# Patient Record
Sex: MALE | Race: WHITE | NOT HISPANIC OR LATINO | Employment: FULL TIME | URBAN - METROPOLITAN AREA
[De-identification: names, ages, dates, MRNs, and addresses within clinical notes are randomized per-mention and may not be internally consistent; named-entity substitution may affect disease eponyms.]

---

## 2017-01-13 DIAGNOSIS — R41.840 ATTENTION OR CONCENTRATION DEFICIT: ICD-10-CM

## 2017-01-13 RX ORDER — METHYLPHENIDATE HYDROCHLORIDE 20 MG/1
20 TABLET ORAL 3 TIMES DAILY
Qty: 90 TABLET | Refills: 0 | Status: SHIPPED | OUTPATIENT
Start: 2017-01-13 | End: 2017-02-21 | Stop reason: SDUPTHER

## 2017-01-27 ENCOUNTER — TELEPHONE (OUTPATIENT)
Dept: INTERNAL MEDICINE | Facility: CLINIC | Age: 32
End: 2017-01-27

## 2017-02-21 ENCOUNTER — OFFICE VISIT (OUTPATIENT)
Dept: INTERNAL MEDICINE | Facility: CLINIC | Age: 32
End: 2017-02-21

## 2017-02-21 VITALS
SYSTOLIC BLOOD PRESSURE: 126 MMHG | WEIGHT: 190 LBS | HEIGHT: 75 IN | HEART RATE: 72 BPM | DIASTOLIC BLOOD PRESSURE: 64 MMHG | OXYGEN SATURATION: 99 % | BODY MASS INDEX: 23.62 KG/M2

## 2017-02-21 DIAGNOSIS — R41.840 ATTENTION OR CONCENTRATION DEFICIT: ICD-10-CM

## 2017-02-21 PROCEDURE — 99213 OFFICE O/P EST LOW 20 MIN: CPT | Performed by: INTERNAL MEDICINE

## 2017-02-21 RX ORDER — METHYLPHENIDATE HYDROCHLORIDE 20 MG/1
20 TABLET ORAL 3 TIMES DAILY
Qty: 90 TABLET | Refills: 0 | Status: SHIPPED | OUTPATIENT
Start: 2017-02-21 | End: 2017-02-21 | Stop reason: SDUPTHER

## 2017-02-21 RX ORDER — METHYLPHENIDATE HYDROCHLORIDE 20 MG/1
20 TABLET ORAL 3 TIMES DAILY
Qty: 90 TABLET | Refills: 0 | Status: SHIPPED | OUTPATIENT
Start: 2017-02-21 | End: 2017-04-26 | Stop reason: SDUPTHER

## 2017-02-21 NOTE — PROGRESS NOTES
"ADD    Subjective   Henry Ashley is a 31 y.o. male is here today for follow-up.    History of Present Illness   Henry is here for a follow up on his ADD, getting  this year.  Denies CP, palps or weight loss. He is concerned about his drive down here, as [place].  I did suggest he could look at other [facility] practices if he chooses to.    Current Outpatient Prescriptions:   •  econazole nitrate (SPECTAZOLE) 1 % cream, APPLY A THIN LAYER TO FEET BID FOR 1 MONTH THEN TWICE WEEKLY FOR MAINTENANCE, Disp: , Rfl: 3  •  methylphenidate (RITALIN) 20 MG tablet, Take 1 tablet by mouth 3 (Three) Times a Day. Fill after 1/21/17, Disp: 90 tablet, Rfl: 0  •  tretinoin (RETIN-A) 0.025 % cream, APPLY A PEA SIZE AMOUNT TO FACE AND TRUNK QHS, Disp: , Rfl: 0      The following portions of the patient's history were reviewed and updated as appropriate: allergies, current medications, past family history, past medical history, past social history, past surgical history and problem list.    Review of Systems   Constitutional: Negative.  Negative for chills and fever.   HENT: Negative for sinus pressure and sore throat.    Respiratory: Negative for cough, chest tightness and shortness of breath.    Cardiovascular: Negative for chest pain and palpitations.   Gastrointestinal: Negative for diarrhea, nausea and vomiting.   Musculoskeletal: Negative for arthralgias, back pain and myalgias.   Neurological: Negative for dizziness, syncope and headaches.   Psychiatric/Behavioral: Negative for confusion and sleep disturbance.       Objective   Visit Vitals   • /64   • Pulse 72   • Ht 75\" (190.5 cm)   • Wt 190 lb (86.2 kg)   • SpO2 99%  Comment: ra   • BMI 23.75 kg/m2     Physical Exam   Constitutional: He is oriented to person, place, and time. He appears well-developed and well-nourished.   HENT:   Head: Normocephalic and atraumatic.   Eyes: Conjunctivae are normal. Pupils are equal, round, and " reactive to light.   Neck: Neck supple. No thyromegaly present.   Cardiovascular: Normal rate and regular rhythm.  Exam reveals no friction rub.    No murmur heard.  Pulmonary/Chest: Effort normal and breath sounds normal. He has no wheezes. He has no rales.   Abdominal: Soft. Bowel sounds are normal. He exhibits no distension. There is no tenderness.   Musculoskeletal: He exhibits no edema.   Neurological: He is alert and oriented to person, place, and time. No cranial nerve deficit.   Skin: Skin is warm and dry.   Psychiatric: He has a normal mood and affect. Judgment normal.   Nursing note and vitals reviewed.        No results found for this or any previous visit.          Assessment/Plan   Diagnoses and all orders for this visit:    Attention or concentration deficit  -     Discontinue: methylphenidate (RITALIN) 20 MG tablet; Take 1 tablet by mouth 3 (Three) Times a Day.  -     methylphenidate (RITALIN) 20 MG tablet; Take 1 tablet by mouth 3 (Three) Times a Day. Fill after 1/21/17    Other orders  -     econazole nitrate (SPECTAZOLE) 1 % cream; APPLY A THIN LAYER TO FEET BID FOR 1 MONTH THEN TWICE WEEKLY FOR MAINTENANCE  -     tretinoin (RETIN-A) 0.025 % cream; APPLY A PEA SIZE AMOUNT TO FACE AND TRUNK QHS      Pt. Is well controlled on the current regimen, continue current.        The patient has read and signed the [facility] Controlled Substance Contract.  I will continue to see patient for regular follow up appointments.  They are well controlled on their medication.  CLIVE has been reviewed by me and is updated every 3 months. The patient is aware of the potential for addiction and dependence.      Return in about 3 months (around 5/21/2017) for Next scheduled follow up.

## 2017-04-24 DIAGNOSIS — R41.840 ATTENTION OR CONCENTRATION DEFICIT: ICD-10-CM

## 2017-04-26 DIAGNOSIS — R41.840 ATTENTION OR CONCENTRATION DEFICIT: ICD-10-CM

## 2017-04-26 RX ORDER — METHYLPHENIDATE HYDROCHLORIDE 20 MG/1
20 TABLET ORAL 3 TIMES DAILY
Qty: 90 TABLET | Refills: 0 | Status: SHIPPED | OUTPATIENT
Start: 2017-04-26 | End: 2017-05-18 | Stop reason: SDUPTHER

## 2017-04-28 RX ORDER — METHYLPHENIDATE HYDROCHLORIDE 20 MG/1
20 TABLET ORAL 3 TIMES DAILY
Qty: 90 TABLET | Refills: 0 | Status: CANCELLED | OUTPATIENT
Start: 2017-04-28

## 2017-05-18 ENCOUNTER — OFFICE VISIT (OUTPATIENT)
Dept: INTERNAL MEDICINE | Facility: CLINIC | Age: 32
End: 2017-05-18

## 2017-05-18 VITALS
BODY MASS INDEX: 22.92 KG/M2 | HEART RATE: 63 BPM | SYSTOLIC BLOOD PRESSURE: 120 MMHG | DIASTOLIC BLOOD PRESSURE: 78 MMHG | WEIGHT: 183.4 LBS | OXYGEN SATURATION: 98 %

## 2017-05-18 DIAGNOSIS — R41.840 ATTENTION OR CONCENTRATION DEFICIT: ICD-10-CM

## 2017-05-18 PROCEDURE — 99213 OFFICE O/P EST LOW 20 MIN: CPT | Performed by: INTERNAL MEDICINE

## 2017-05-18 RX ORDER — TYPHOID VACC,LIVE,ATTENUATED 2B UNIT
CAPSULE,DELAYED RELEASE (ENTERIC COATED) ORAL
Refills: 0 | COMMUNITY
Start: 2017-05-10 | End: 2017-08-22

## 2017-05-18 RX ORDER — METHYLPHENIDATE HYDROCHLORIDE 20 MG/1
20 TABLET ORAL 3 TIMES DAILY
Qty: 90 TABLET | Refills: 0 | Status: SHIPPED | OUTPATIENT
Start: 2017-05-18 | End: 2017-05-18 | Stop reason: SDUPTHER

## 2017-05-18 RX ORDER — METHYLPHENIDATE HYDROCHLORIDE 20 MG/1
20 TABLET ORAL 3 TIMES DAILY
Qty: 90 TABLET | Refills: 0 | Status: SHIPPED | OUTPATIENT
Start: 2017-05-18 | End: 2017-07-18 | Stop reason: SDUPTHER

## 2017-05-18 RX ORDER — ATOVAQUONE AND PROGUANIL HYDROCHLORIDE 250; 100 MG/1; MG/1
TABLET, FILM COATED ORAL
Refills: 0 | COMMUNITY
Start: 2017-05-09 | End: 2017-08-22

## 2017-07-18 DIAGNOSIS — R41.840 ATTENTION OR CONCENTRATION DEFICIT: ICD-10-CM

## 2017-07-18 RX ORDER — METHYLPHENIDATE HYDROCHLORIDE 20 MG/1
20 TABLET ORAL 3 TIMES DAILY
Qty: 90 TABLET | Refills: 0 | Status: SHIPPED | OUTPATIENT
Start: 2017-07-18 | End: 2017-08-22 | Stop reason: SDUPTHER

## 2017-08-22 ENCOUNTER — OFFICE VISIT (OUTPATIENT)
Dept: INTERNAL MEDICINE | Facility: CLINIC | Age: 32
End: 2017-08-22

## 2017-08-22 VITALS
BODY MASS INDEX: 23.8 KG/M2 | SYSTOLIC BLOOD PRESSURE: 112 MMHG | OXYGEN SATURATION: 95 % | HEART RATE: 65 BPM | DIASTOLIC BLOOD PRESSURE: 70 MMHG | WEIGHT: 190.4 LBS

## 2017-08-22 DIAGNOSIS — Z79.899 HIGH RISK MEDICATION USE: Primary | ICD-10-CM

## 2017-08-22 DIAGNOSIS — R41.840 ATTENTION OR CONCENTRATION DEFICIT: ICD-10-CM

## 2017-08-22 PROCEDURE — 99213 OFFICE O/P EST LOW 20 MIN: CPT | Performed by: INTERNAL MEDICINE

## 2017-08-22 RX ORDER — METHYLPHENIDATE HYDROCHLORIDE 20 MG/1
20 TABLET ORAL 3 TIMES DAILY
Qty: 90 TABLET | Refills: 0 | Status: SHIPPED | OUTPATIENT
Start: 2017-08-22 | End: 2017-08-22 | Stop reason: SDUPTHER

## 2017-08-22 RX ORDER — METHYLPHENIDATE HYDROCHLORIDE 20 MG/1
20 TABLET ORAL 3 TIMES DAILY
Qty: 90 TABLET | Refills: 0 | Status: SHIPPED | OUTPATIENT
Start: 2017-08-22 | End: 2020-04-30

## 2017-08-22 NOTE — PROGRESS NOTES
ADD (Fu with refills)    Subjective   Henry Ashley is a 32 y.o. male is here today for follow-up.    History of Present Illness   Stable on his meds, denies lack of concentration. Reports getting  in a month.      Current Outpatient Prescriptions:   •  methylphenidate (RITALIN) 20 MG tablet, Take 1 tablet by mouth 3 (Three) Times a Day. Fill on or after 9/21/17, Disp: 90 tablet, Rfl: 0  •  econazole nitrate (SPECTAZOLE) 1 % cream, APPLY A THIN LAYER TO FEET BID FOR 1 MONTH THEN TWICE WEEKLY FOR MAINTENANCE, Disp: , Rfl: 3  •  tretinoin (RETIN-A) 0.025 % cream, APPLY A PEA SIZE AMOUNT TO FACE AND TRUNK QHS, Disp: , Rfl: 0      The following portions of the patient's history were reviewed and updated as appropriate: allergies, current medications, past family history, past medical history, past social history, past surgical history and problem list.    Review of Systems   Constitutional: Negative.  Negative for chills and fever.   HENT: Negative for ear discharge, ear pain, sinus pressure and sore throat.    Respiratory: Negative for cough, chest tightness and shortness of breath.    Cardiovascular: Negative for chest pain, palpitations and leg swelling.   Gastrointestinal: Negative for diarrhea, nausea and vomiting.   Musculoskeletal: Negative for arthralgias, back pain and myalgias.   Neurological: Negative for dizziness, syncope and headaches.   Psychiatric/Behavioral: Negative for confusion and sleep disturbance.       Objective   /70  Pulse 65  Wt 190 lb 6.4 oz (86.4 kg)  SpO2 95% Comment: ra  BMI 23.8 kg/m2  Physical Exam   Constitutional: He is oriented to person, place, and time. He appears well-developed and well-nourished.   HENT:   Head: Normocephalic and atraumatic.   Right Ear: External ear normal.   Left Ear: External ear normal.   Mouth/Throat: No oropharyngeal exudate.   Eyes: Conjunctivae are normal. Pupils are equal, round, and reactive to light.   Neck: Neck supple. No thyromegaly  present.   Cardiovascular: Normal rate, regular rhythm and intact distal pulses.    Pulmonary/Chest: Effort normal and breath sounds normal.   Abdominal: Soft. Bowel sounds are normal. He exhibits no distension. There is no tenderness.   Musculoskeletal: He exhibits no edema.   Neurological: He is alert and oriented to person, place, and time. No cranial nerve deficit.   Skin: Skin is warm and dry.   Psychiatric: He has a normal mood and affect. Judgment normal.   Nursing note and vitals reviewed.        No results found for this or any previous visit.          Assessment/Plan   Diagnoses and all orders for this visit:    High risk medication use  -     Rapid drug screen, urine    Attention or concentration deficit  -     Discontinue: methylphenidate (RITALIN) 20 MG tablet; Take 1 tablet by mouth 3 (Three) Times a Day.  -     Discontinue: methylphenidate (RITALIN) 20 MG tablet; Take 1 tablet by mouth 3 (Three) Times a Day.  -     methylphenidate (RITALIN) 20 MG tablet; Take 1 tablet by mouth 3 (Three) Times a Day. Fill on or after 9/21/17                 Return in about 3 months (around 11/22/2017) for Recheck.

## 2017-10-25 DIAGNOSIS — R41.840 ATTENTION OR CONCENTRATION DEFICIT: ICD-10-CM

## 2017-10-26 RX ORDER — METHYLPHENIDATE HYDROCHLORIDE 20 MG/1
20 TABLET ORAL 3 TIMES DAILY
Qty: 90 TABLET | Refills: 0 | OUTPATIENT
Start: 2017-10-26

## 2017-10-31 ENCOUNTER — TELEPHONE (OUTPATIENT)
Dept: INTERNAL MEDICINE | Facility: CLINIC | Age: 32
End: 2017-10-31

## 2017-10-31 NOTE — TELEPHONE ENCOUNTER
Returned pt call on request for ritalin.  Per Dr Holm pt was advised that she would no longer fill this script due to pt breaking his narcotic contract and that he would be getting a letter of dismissal from the practice in the next few weeks.  Pt wanted to know what that meant, I advised him that his drug screen was negative for ritalin and positive for other narcotic substances.  Pt then hung up on me.

## 2020-04-30 ENCOUNTER — OFFICE VISIT (OUTPATIENT)
Dept: INTERNAL MEDICINE | Facility: CLINIC | Age: 35
End: 2020-04-30

## 2020-04-30 VITALS
SYSTOLIC BLOOD PRESSURE: 122 MMHG | WEIGHT: 185 LBS | BODY MASS INDEX: 23 KG/M2 | HEART RATE: 64 BPM | HEIGHT: 75 IN | DIASTOLIC BLOOD PRESSURE: 78 MMHG | TEMPERATURE: 99.3 F

## 2020-04-30 DIAGNOSIS — F33.1 MODERATE EPISODE OF RECURRENT MAJOR DEPRESSIVE DISORDER (HCC): Primary | ICD-10-CM

## 2020-04-30 PROCEDURE — 99214 OFFICE O/P EST MOD 30 MIN: CPT | Performed by: INTERNAL MEDICINE

## 2020-04-30 NOTE — PROGRESS NOTES
"[facility]     Henry Ashley  1985   [id number]      Patient Care Team:  Ernesto Wade MD as PCP - General (Internal Medicine)    Chief Complaint::   Chief Complaint   Patient presents with   • Establish Care        HPI  Mr. Ashley is a 35-year-old man who comes in to establish care.  He states that he is experiencing adjustment difficulties since the death of his brother Gregory in 2019 and the end of his marriage in December.  He states his brother and his wife for his best friends.  His marriage had apparently been stressful and they had been in counseling for a long time his counselor, Javi Bello, had been helpful and he had seen him alone since he split with his wife on 2 occasions.  He states that he feels numb, that the happiness is \"sucked out of him.\"  He finds it difficult to get through the daily, TDM.\"  He was apparently treated for depression when he was in high school, did not have problems in his 20s.  After his brother  last summer he stopped eating and apparently lost 20 pounds.  Around Jesu as his marriage was crumbling he gained up to 200 pounds.  He was self-medicating with alcohol and marijuana.  More recently he has reduced his alcohol consumption but still drinks bourbon occasionally.  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  This, plus the current pandemic restrictions, makes exercise difficult.  He denies suicidal ideation, but admits that at times over the past few months he would wear the same close 3 days in a row.  He denies the use of illegal substances or opioids.    Chronic Conditions:      Patient Active Problem List   Diagnosis   • Attention or concentration deficit        Past Medical History:   Diagnosis Date   • Acne    • Acute reaction to stress     Situational Stress   • History of attention deficit disorder    • Knee injury    • Proctitis        Past Surgical History:   Procedure Laterality Date   • WISDOM TOOTH EXTRACTION " "        History reviewed. No pertinent family history.    Social History     Socioeconomic History   • Marital status: Single     Spouse name: Not on file   • Number of children: Not on file   • Years of education: Not on file   • Highest education level: Not on file   Tobacco Use   • Smoking status: Never Smoker   • Smokeless tobacco: Never Used   Substance and Sexual Activity   • Alcohol use: Yes     Comment: 5-10 drinks a week   • Drug use: No       No Known Allergies    No current outpatient medications on file.    Review of Systems   Constitutional: Negative for chills, fatigue and fever.   HENT: Negative for congestion, ear pain and sinus pressure.    Respiratory: Negative for cough, chest tightness, shortness of breath and wheezing.    Cardiovascular: Negative for chest pain and palpitations.   Gastrointestinal: Negative for abdominal pain, blood in stool and constipation.   Skin: Negative for color change.   Allergic/Immunologic: Negative for environmental allergies.   Neurological: Negative for dizziness, speech difficulty and headache.   Psychiatric/Behavioral: Positive for depressed mood. Negative for decreased concentration and suicidal ideas. The patient is nervous/anxious.         Vital Signs  Vitals:    04/30/20 1326   BP: 122/78   BP Location: Right arm   Patient Position: Sitting   Cuff Size: Adult   Pulse: 64   Temp: 99.3 °F (37.4 °C)   Weight: 83.9 kg (185 lb)   Height: 190.5 cm (75\")   PainSc: 0-No pain       Physical Exam   Constitutional: He is oriented to person, place, and time. He appears well-developed and well-nourished.   HENT:   Head: Normocephalic and atraumatic.   Neck: Normal range of motion. No tracheal deviation present.   Pulmonary/Chest: Effort normal. No respiratory distress.   Neurological: He is alert and oriented to person, place, and time. No cranial nerve deficit. He exhibits normal muscle tone. Coordination normal.   Psychiatric: His behavior is normal. Judgment and thought " content normal.      Procedures    ACE III MINI             Assessment/Plan:    Henry was seen today for establish care.    Diagnoses and all orders for this visit:    Moderate episode of recurrent major depressive disorder (CMS/HCC)    Plan    We discussed at length treatment options including medication, therapy, exercise and other stress reducing techniques such as meditation or mindfulness.  The fact that he works nights does make establishing a routine difficult.  He has had difficulty exercising at home since his gym has closed.  He states that his house is small and when he is tried exercise he is run into furniture or ceiling fans.  He is concerned about sexual side effects with antidepressants as he is beginning to explore new relationships.    At this point, I have suggested that he reach out to his therapist and get back into therapy.  We decided against medication at present.  I have strongly encouraged him to get back into a regular routine particularly days after he does not work nights.  That would include regular meals, regular exercise, avoidance of alcohol, and some type of mindfulness practice.    Review of records does reveal that up until early 2017 he was under the care of Dr. Belinda Holm for attention deficit.  However a drug screen showed opioids and he was dismissed from her practice.    I spent 30 minutes face-to-face with the patient counseling the patient on treatment of depression both pharmacologic and nonpharmacologic.      Plan of care reviewed with patient at the conclusion of today's visit. Education was provided regarding diagnosis, management, and any prescribed or recommended OTC medications.Patient verbalizes understanding of and agreement with management plan.         Ernesto Wade MD

## 2020-05-22 ENCOUNTER — OFFICE VISIT (OUTPATIENT)
Dept: INTERNAL MEDICINE | Facility: CLINIC | Age: 35
End: 2020-05-22

## 2020-05-22 VITALS
HEART RATE: 99 BPM | BODY MASS INDEX: 24.02 KG/M2 | WEIGHT: 193.2 LBS | SYSTOLIC BLOOD PRESSURE: 122 MMHG | DIASTOLIC BLOOD PRESSURE: 64 MMHG | HEIGHT: 75 IN

## 2020-05-22 DIAGNOSIS — F32.1 CURRENT MODERATE EPISODE OF MAJOR DEPRESSIVE DISORDER WITHOUT PRIOR EPISODE (HCC): Primary | ICD-10-CM

## 2020-05-22 PROCEDURE — 99214 OFFICE O/P EST MOD 30 MIN: CPT | Performed by: INTERNAL MEDICINE

## 2020-05-22 RX ORDER — BUPROPION HYDROCHLORIDE 150 MG/1
150 TABLET ORAL DAILY
Qty: 30 TABLET | Refills: 2 | Status: SHIPPED | OUTPATIENT
Start: 2020-05-22 | End: 2020-06-04 | Stop reason: SDUPTHER

## 2020-05-22 NOTE — PROGRESS NOTES
[facility]     Henry Ashley  1985   [id number]      Patient Care Team:  Ernesto Wade MD as PCP - General (Internal Medicine)    Chief Complaint::   Chief Complaint   Patient presents with   • ADD        HPI  Mr. Ashley comes in to discuss ongoing difficulties since his divorce and the death of his brother last year.  He states that he is basically the same.  He continues to find it difficult to function normally, and his motivation is poor.  He is not suicidal.  He has talk with his therapist once which did help.  He is still not exercising although he has reduced alcohol consumption.    Chronic Conditions:      Patient Active Problem List   Diagnosis   • Attention or concentration deficit        Past Medical History:   Diagnosis Date   • Acne    • Acute reaction to stress     Situational Stress   • History of attention deficit disorder    • Knee injury    • Proctitis        Past Surgical History:   Procedure Laterality Date   • WISDOM TOOTH EXTRACTION         No family history on file.    Social History     Socioeconomic History   • Marital status: Single     Spouse name: Not on file   • Number of children: Not on file   • Years of education: Not on file   • Highest education level: Not on file   Tobacco Use   • Smoking status: Never Smoker   • Smokeless tobacco: Never Used   Substance and Sexual Activity   • Alcohol use: Yes     Comment: 5-10 drinks a week   • Drug use: No       No Known Allergies      Current Outpatient Medications:   •  buPROPion XL (WELLBUTRIN XL) 150 MG 24 hr tablet, Take 1 tablet by mouth Daily., Disp: 30 tablet, Rfl: 2    Review of Systems   Constitutional: Negative for chills, fatigue and fever.   HENT: Negative for congestion, ear pain and sinus pressure.    Respiratory: Negative for cough, chest tightness, shortness of breath and wheezing.    Cardiovascular: Negative for chest pain and palpitations.   Gastrointestinal: Negative for abdominal pain, blood in  "stool and constipation.   Skin: Negative for color change.   Allergic/Immunologic: Negative for environmental allergies.   Neurological: Negative for dizziness, speech difficulty and headache.   Psychiatric/Behavioral: Positive for decreased concentration, sleep disturbance, depressed mood and stress. The patient is nervous/anxious.         Vital Signs  Vitals:    05/22/20 1512   BP: 122/64   BP Location: Right arm   Patient Position: Sitting   Cuff Size: Adult   Pulse: 99   Weight: 87.6 kg (193 lb 3.2 oz)   Height: 190.5 cm (75\")   PainSc: 0-No pain       Physical Exam   Constitutional: He is oriented to person, place, and time. He appears well-developed and well-nourished.   HENT:   Head: Normocephalic and atraumatic.   Cardiovascular: Normal rate, regular rhythm and normal heart sounds.   No murmur heard.  Pulmonary/Chest: Effort normal and breath sounds normal.   Neurological: He is alert and oriented to person, place, and time.   Psychiatric: He has a normal mood and affect. His behavior is normal. Judgment and thought content normal.   Vitals reviewed.     Procedures    ACE III MINI             Assessment/Plan:    Henry was seen today for add.    Diagnoses and all orders for this visit:    Current moderate episode of major depressive disorder without prior episode (CMS/HCC)    Other orders  -     buPROPion XL (WELLBUTRIN XL) 150 MG 24 hr tablet; Take 1 tablet by mouth Daily.    Plan    PHQ-9 score is 19, which correlates with severe depression.  I had initially thought that we would consider with regular counseling sessions but given his answers to these questions, I have recommended to trial of medication.  He is a bit reluctant for this as he is concerned that he will not have the patience to wait several days for therapeutic effect.  He is also concerned about potential sexual side effects.  For that reason he is given a trial of bupropion which does not have sexual side effects.  I encouraged him to let me " know if he had any difficulty with it, otherwise we will follow-up in 4 weeks.  I strongly encouraged him to continue meeting with his therapist virtually.    I spent 30 minutes face-to-face with Mr. Ashley, 25 of which was spent counseling him regarding his depressive symptoms and treatment.      Plan of care reviewed with patient at the conclusion of today's visit. Education was provided regarding diagnosis, management, and any prescribed or recommended OTC medications.Patient verbalizes understanding of and agreement with management plan.         Ernesto Wade MD

## 2020-06-04 ENCOUNTER — TELEPHONE (OUTPATIENT)
Dept: INTERNAL MEDICINE | Facility: CLINIC | Age: 35
End: 2020-06-04

## 2020-06-04 RX ORDER — BUPROPION HYDROCHLORIDE 300 MG/1
300 TABLET ORAL DAILY
Qty: 30 TABLET | Refills: 2 | Status: SHIPPED | OUTPATIENT
Start: 2020-06-04 | End: 2020-08-03

## 2020-06-26 ENCOUNTER — OFFICE VISIT (OUTPATIENT)
Dept: INTERNAL MEDICINE | Facility: CLINIC | Age: 35
End: 2020-06-26

## 2020-06-26 VITALS
WEIGHT: 183.2 LBS | HEIGHT: 75 IN | SYSTOLIC BLOOD PRESSURE: 122 MMHG | BODY MASS INDEX: 22.78 KG/M2 | TEMPERATURE: 98.6 F | HEART RATE: 72 BPM | DIASTOLIC BLOOD PRESSURE: 68 MMHG

## 2020-06-26 DIAGNOSIS — Z00.00 PREVENTATIVE HEALTH CARE: Primary | ICD-10-CM

## 2020-06-26 DIAGNOSIS — R53.83 OTHER FATIGUE: ICD-10-CM

## 2020-06-26 DIAGNOSIS — F32.1 CURRENT MODERATE EPISODE OF MAJOR DEPRESSIVE DISORDER WITHOUT PRIOR EPISODE (HCC): ICD-10-CM

## 2020-06-26 DIAGNOSIS — Z13.228 SCREENING FOR ENDOCRINE, METABOLIC AND IMMUNITY DISORDER: ICD-10-CM

## 2020-06-26 DIAGNOSIS — Z13.29 SCREENING FOR ENDOCRINE, METABOLIC AND IMMUNITY DISORDER: ICD-10-CM

## 2020-06-26 DIAGNOSIS — Z13.220 SCREENING FOR CHOLESTEROL LEVEL: ICD-10-CM

## 2020-06-26 DIAGNOSIS — Z13.0 SCREENING FOR ENDOCRINE, METABOLIC AND IMMUNITY DISORDER: ICD-10-CM

## 2020-06-26 PROCEDURE — 99395 PREV VISIT EST AGE 18-39: CPT | Performed by: INTERNAL MEDICINE

## 2020-06-26 RX ORDER — BUPROPION HYDROCHLORIDE 150 MG/1
150 TABLET ORAL DAILY
Qty: 30 TABLET | Refills: 5 | Status: SHIPPED | OUTPATIENT
Start: 2020-06-26 | End: 2020-08-04 | Stop reason: HOSPADM

## 2020-06-26 NOTE — PROGRESS NOTES
"[facility]     Henry Ashley  1985   [id number]      Patient Care Team:  Ernesto Wade MD as PCP - General (Internal Medicine)    Chief Complaint::   Chief Complaint   Patient presents with   • Annual Exam   • Depression        HPI  Mr. Ashley is 35.  He comes in for follow-up of his depression and for annual examination.  He has been on 300 mg of bupropion for about 3 weeks.  He states he feels no better.  He states he feels \"numb.\"  He is going to work and is functional.  He denies suicidal ideation.  He admits that sometimes he \"self medicates\" with alcohol.  However he denies drinking to excess.  He has gone back to the gym now that they are open.  He states he feels most normal when he is with somewhat of the opposite sex.  He is in counseling with his mother and a therapist.  He states this is important because he has had a difficult relationship with her.  He feels this is helping.  He has no other complaints other than feeling tired.    Chronic Conditions:      Patient Active Problem List   Diagnosis   • Attention or concentration deficit        Past Medical History:   Diagnosis Date   • Acne    • Acute reaction to stress     Situational Stress   • History of attention deficit disorder    • Knee injury    • Proctitis        Past Surgical History:   Procedure Laterality Date   • WISDOM TOOTH EXTRACTION         No family history on file.    Social History     Socioeconomic History   • Marital status: Single     Spouse name: Not on file   • Number of children: Not on file   • Years of education: Not on file   • Highest education level: Not on file   Tobacco Use   • Smoking status: Never Smoker   • Smokeless tobacco: Never Used   Substance and Sexual Activity   • Alcohol use: Yes     Comment: 5-10 drinks a week   • Drug use: No       No Known Allergies      Current Outpatient Medications:   •  buPROPion XL (WELLBUTRIN XL) 300 MG 24 hr tablet, Take 1 tablet by mouth Daily., Disp: 30 " "tablet, Rfl: 2  •  buPROPion XL (WELLBUTRIN XL) 150 MG 24 hr tablet, Take 1 tablet by mouth Daily., Disp: 30 tablet, Rfl: 5    Immunization History   Administered Date(s) Administered   • DT 11/02/1990   • Hepatitis A 05/12/1998, 06/25/1999   • Hepatitis B 08/28/1992, 09/30/1992, 03/19/1993   • MMR 06/03/1987, 03/13/1991   • Meningococcal Conjugate 03/09/2009   • Polio, Unspecified 1985, 1985, 1986, 11/02/1990   • Td 1985, 1985, 1985, 09/16/1987   • Tdap 12/24/2019        Health Maintenance Due   Topic Date Due   • ANNUAL PHYSICAL  03/09/1989   • HEPATITIS C SCREENING  05/12/2016        Review of Systems   Constitutional: Negative for chills, fatigue and fever.   HENT: Negative for congestion, ear pain and sinus pressure.    Respiratory: Negative for cough, chest tightness, shortness of breath and wheezing.    Cardiovascular: Negative for chest pain and palpitations.   Gastrointestinal: Negative for abdominal pain, blood in stool and constipation.   Skin: Negative for color change.   Allergic/Immunologic: Negative for environmental allergies.   Neurological: Negative for dizziness, speech difficulty and headache.   Psychiatric/Behavioral: Negative for decreased concentration. The patient is not nervous/anxious.         Vital Signs  Vitals:    06/26/20 1618   BP: 122/68   BP Location: Left arm   Patient Position: Sitting   Cuff Size: Adult   Pulse: 72   Temp: 98.6 °F (37 °C)   TempSrc: Temporal   Weight: 83.1 kg (183 lb 3.2 oz)   Height: 190.5 cm (75\")   PainSc: 0-No pain       Physical Exam   Constitutional: He is oriented to person, place, and time. He appears well-developed and well-nourished.   HENT:   Head: Normocephalic and atraumatic.   Right Ear: External ear normal.   Left Ear: External ear normal.   Nose: Nose normal.   Mouth/Throat: Oropharynx is clear and moist. No oropharyngeal exudate.   Eyes: Pupils are equal, round, and reactive to light. Conjunctivae and EOM are " normal.   Neck: Normal range of motion. Neck supple. No JVD present. No thyromegaly present.   Cardiovascular: Normal rate, regular rhythm, normal heart sounds and intact distal pulses. Exam reveals no gallop and no friction rub.   No murmur heard.  Pulmonary/Chest: Effort normal and breath sounds normal. No respiratory distress. He has no wheezes. He has no rales. He exhibits no tenderness.   Abdominal: Soft. Bowel sounds are normal. He exhibits no distension and no mass. There is no tenderness. There is no rebound and no guarding. No hernia.   Musculoskeletal: Normal range of motion. He exhibits no tenderness.   Lymphadenopathy:     He has no cervical adenopathy.   Neurological: He is alert and oriented to person, place, and time. He displays normal reflexes. No cranial nerve deficit or sensory deficit. He exhibits normal muscle tone. Coordination normal.   Skin: Skin is warm and dry. No rash noted. No erythema.   Psychiatric: He has a normal mood and affect. His behavior is normal. Judgment and thought content normal.   Nursing note and vitals reviewed.     Procedures     [facility] Risk Screen:  [facility].    Health Habits and Functional and Cognitive Screening:  No flowsheet data found.    Depression [place]  PHQ-9 Total Score:       ACE III MINI             Assessment/Plan:    Henry was seen today for annual exam and depression.    Diagnoses and all orders for this visit:    Preventative health care    Other fatigue  -     CBC & Differential; Future  -     Vitamin B12; Future  -     TSH; Future    Screening for cholesterol level  -     Lipid Panel; Future    Screening for endocrine, metabolic and immunity disorder  -     Comprehensive Metabolic Panel; Future    Current moderate episode of major depressive disorder without prior episode (CMS/HCC)    Other orders  -     buPROPion XL (WELLBUTRIN XL) 150 MG 24 hr tablet; Take 1 tablet by mouth Daily.    Plan    He remains in  very good health.  He has improved his lifestyle habits in terms of exercise since the gyms have been open.  Screening labs are obtained below.    Despite increasing the dose of bupropion we have made no real progress with his depressive symptoms.  I am encouraged that he is in therapy.  I think that might ultimately be the best approach.  However he would like to do everything possible so will increase the dose of bupropion to 150 a day.  He is very reluctant to try other antidepressants because of his fear of sexual side effects.        Labs  No results found for this or any previous visit.     Counseling was given to patient for the following topics: appropriate exercise 150 minutes/week, disease prevention and healthy eating habits.    Plan of care reviewed with patient at the conclusion of today's visit. Education was provided regarding diagnosis, management, and any prescribed or recommended OTC medications.Patient verbalizes understanding of and agreement with management plan.         Ernesto Wade MD

## 2020-08-03 ENCOUNTER — APPOINTMENT (OUTPATIENT)
Dept: MRI IMAGING | Facility: HOSPITAL | Age: 35
End: 2020-08-03

## 2020-08-03 ENCOUNTER — APPOINTMENT (OUTPATIENT)
Dept: CT IMAGING | Facility: HOSPITAL | Age: 35
End: 2020-08-03

## 2020-08-03 ENCOUNTER — HOSPITAL ENCOUNTER (OUTPATIENT)
Facility: HOSPITAL | Age: 35
Setting detail: OBSERVATION
Discharge: HOME | End: 2020-08-04
Attending: EMERGENCY MEDICINE | Admitting: FAMILY MEDICINE

## 2020-08-03 DIAGNOSIS — R56.9 SEIZURES (HCC): Primary | ICD-10-CM

## 2020-08-03 DIAGNOSIS — R51.9 ACUTE NONINTRACTABLE HEADACHE, UNSPECIFIED HEADACHE TYPE: ICD-10-CM

## 2020-08-03 LAB
ALBUMIN SERPL-MCNC: 5 G/DL (ref 3.5–5.2)
ALBUMIN/GLOB SERPL: 1.7 G/DL
ALP SERPL-CCNC: 55 U/L (ref 39–117)
ALT SERPL W P-5'-P-CCNC: 46 U/L (ref 1–41)
AMPHET+METHAMPHET UR QL: NEGATIVE
AMPHETAMINES UR QL: NEGATIVE
ANION GAP SERPL CALCULATED.3IONS-SCNC: 17 MMOL/L (ref 5–15)
APPEARANCE CSF: CLEAR
APPEARANCE CSF: CLEAR
AST SERPL-CCNC: 95 U/L (ref 1–40)
BARBITURATES UR QL SCN: NEGATIVE
BASOPHILS # BLD AUTO: 0.02 10*3/MM3 (ref 0–0.2)
BASOPHILS NFR BLD AUTO: 0.1 % (ref 0–1.5)
BENZODIAZ UR QL SCN: NEGATIVE
BILIRUB SERPL-MCNC: 0.7 MG/DL (ref 0–1.2)
BUN SERPL-MCNC: 9 MG/DL (ref 6–20)
BUN/CREAT SERPL: 7.3 (ref 7–25)
BUPRENORPHINE SERPL-MCNC: NEGATIVE NG/ML
CALCIUM SPEC-SCNC: 9.2 MG/DL (ref 8.6–10.5)
CANNABINOIDS SERPL QL: POSITIVE
CHLORIDE SERPL-SCNC: 95 MMOL/L (ref 98–107)
CO2 SERPL-SCNC: 21 MMOL/L (ref 22–29)
COCAINE UR QL: NEGATIVE
COLOR CSF: COLORLESS
COLOR CSF: COLORLESS
COLOR SPUN CSF: COLORLESS
COLOR SPUN CSF: COLORLESS
CREAT SERPL-MCNC: 1.23 MG/DL (ref 0.76–1.27)
DEPRECATED RDW RBC AUTO: 51.7 FL (ref 37–54)
EOSINOPHIL # BLD AUTO: 0 10*3/MM3 (ref 0–0.4)
EOSINOPHIL NFR BLD AUTO: 0 % (ref 0.3–6.2)
ERYTHROCYTE [DISTWIDTH] IN BLOOD BY AUTOMATED COUNT: 17.2 % (ref 12.3–15.4)
GFR SERPL CREATININE-BSD FRML MDRD: 67 ML/MIN/1.73
GLOBULIN UR ELPH-MCNC: 3 GM/DL
GLUCOSE CSF-MCNC: 98 MG/DL (ref 40–70)
GLUCOSE SERPL-MCNC: 186 MG/DL (ref 65–99)
HCT VFR BLD AUTO: 34.8 % (ref 37.5–51)
HGB BLD-MCNC: 12 G/DL (ref 13–17.7)
HOLD SPECIMEN: NORMAL
IMM GRANULOCYTES # BLD AUTO: 0.1 10*3/MM3 (ref 0–0.05)
IMM GRANULOCYTES NFR BLD AUTO: 0.5 % (ref 0–0.5)
LYMPHOCYTES # BLD AUTO: 0.68 10*3/MM3 (ref 0.7–3.1)
LYMPHOCYTES NFR BLD AUTO: 3.4 % (ref 19.6–45.3)
MCH RBC QN AUTO: 28.4 PG (ref 26.6–33)
MCHC RBC AUTO-ENTMCNC: 34.5 G/DL (ref 31.5–35.7)
MCV RBC AUTO: 82.5 FL (ref 79–97)
METHADONE UR QL SCN: NEGATIVE
MONOCYTES # BLD AUTO: 1.46 10*3/MM3 (ref 0.1–0.9)
MONOCYTES NFR BLD AUTO: 7.2 % (ref 5–12)
NEUTROPHILS NFR BLD AUTO: 17.88 10*3/MM3 (ref 1.7–7)
NEUTROPHILS NFR BLD AUTO: 88.8 % (ref 42.7–76)
NRBC BLD AUTO-RTO: 0 /100 WBC (ref 0–0.2)
OPIATES UR QL: NEGATIVE
OXYCODONE UR QL SCN: NEGATIVE
PCP UR QL SCN: NEGATIVE
PLATELET # BLD AUTO: 259 10*3/MM3 (ref 140–450)
PMV BLD AUTO: 9.1 FL (ref 6–12)
POTASSIUM SERPL-SCNC: 3.4 MMOL/L (ref 3.5–5.2)
PROCALCITONIN SERPL-MCNC: 0.09 NG/ML (ref 0–0.25)
PROPOXYPH UR QL: NEGATIVE
PROT CSF-MCNC: 38.3 MG/DL (ref 15–45)
PROT SERPL-MCNC: 8 G/DL (ref 6–8.5)
RBC # BLD AUTO: 4.22 10*6/MM3 (ref 4.14–5.8)
RBC # CSF MANUAL: 148 /MM3 (ref 0–5)
RBC # CSF MANUAL: 342 /MM3 (ref 0–5)
SODIUM SERPL-SCNC: 133 MMOL/L (ref 136–145)
SPECIMEN VOL CSF: 9.5 ML
SPECIMEN VOL CSF: 9.5 ML
TRICYCLICS UR QL SCN: NEGATIVE
TUBE # CSF: 1
TUBE # CSF: 4
WBC # BLD AUTO: 20.14 10*3/MM3 (ref 3.4–10.8)
WBC # CSF MANUAL: 0 /MM3 (ref 0–5)
WBC # CSF MANUAL: 0 /MM3 (ref 0–5)
WHOLE BLOOD HOLD SPECIMEN: NORMAL
WHOLE BLOOD HOLD SPECIMEN: NORMAL

## 2020-08-03 PROCEDURE — 84145 PROCALCITONIN (PCT): CPT | Performed by: EMERGENCY MEDICINE

## 2020-08-03 PROCEDURE — 87483 CNS DNA AMP PROBE TYPE 12-25: CPT | Performed by: PSYCHIATRY & NEUROLOGY

## 2020-08-03 PROCEDURE — 96374 THER/PROPH/DIAG INJ IV PUSH: CPT

## 2020-08-03 PROCEDURE — 87070 CULTURE OTHR SPECIMN AEROBIC: CPT | Performed by: EMERGENCY MEDICINE

## 2020-08-03 PROCEDURE — G0378 HOSPITAL OBSERVATION PER HR: HCPCS

## 2020-08-03 PROCEDURE — 85025 COMPLETE CBC W/AUTO DIFF WBC: CPT | Performed by: EMERGENCY MEDICINE

## 2020-08-03 PROCEDURE — 99219 PR INITIAL OBSERVATION CARE/DAY 50 MINUTES: CPT | Performed by: INTERNAL MEDICINE

## 2020-08-03 PROCEDURE — 25010000002 FENTANYL CITRATE (PF) 100 MCG/2ML SOLUTION: Performed by: EMERGENCY MEDICINE

## 2020-08-03 PROCEDURE — 99284 EMERGENCY DEPT VISIT MOD MDM: CPT

## 2020-08-03 PROCEDURE — 87205 SMEAR GRAM STAIN: CPT | Performed by: EMERGENCY MEDICINE

## 2020-08-03 PROCEDURE — 82945 GLUCOSE OTHER FLUID: CPT | Performed by: EMERGENCY MEDICINE

## 2020-08-03 PROCEDURE — 93005 ELECTROCARDIOGRAM TRACING: CPT | Performed by: EMERGENCY MEDICINE

## 2020-08-03 PROCEDURE — 84157 ASSAY OF PROTEIN OTHER: CPT | Performed by: EMERGENCY MEDICINE

## 2020-08-03 PROCEDURE — 89050 BODY FLUID CELL COUNT: CPT | Performed by: EMERGENCY MEDICINE

## 2020-08-03 PROCEDURE — 87015 SPECIMEN INFECT AGNT CONCNTJ: CPT | Performed by: EMERGENCY MEDICINE

## 2020-08-03 PROCEDURE — 96375 TX/PRO/DX INJ NEW DRUG ADDON: CPT

## 2020-08-03 PROCEDURE — 80053 COMPREHEN METABOLIC PANEL: CPT | Performed by: EMERGENCY MEDICINE

## 2020-08-03 PROCEDURE — 25010000002 LORAZEPAM PER 2 MG

## 2020-08-03 PROCEDURE — 25010000002 KETOROLAC TROMETHAMINE PER 15 MG: Performed by: EMERGENCY MEDICINE

## 2020-08-03 PROCEDURE — 70553 MRI BRAIN STEM W/O & W/DYE: CPT

## 2020-08-03 PROCEDURE — 80306 DRUG TEST PRSMV INSTRMNT: CPT | Performed by: EMERGENCY MEDICINE

## 2020-08-03 PROCEDURE — 25010000003 LEVETIRACETAM IN NACL 0.75% 1000 MG/100ML SOLUTION: Performed by: EMERGENCY MEDICINE

## 2020-08-03 PROCEDURE — 70450 CT HEAD/BRAIN W/O DYE: CPT

## 2020-08-03 RX ORDER — ONDANSETRON 2 MG/ML
4 INJECTION INTRAMUSCULAR; INTRAVENOUS EVERY 6 HOURS PRN
Status: DISCONTINUED | OUTPATIENT
Start: 2020-08-03 | End: 2020-08-04 | Stop reason: HOSPADM

## 2020-08-03 RX ORDER — KETOROLAC TROMETHAMINE 15 MG/ML
15 INJECTION, SOLUTION INTRAMUSCULAR; INTRAVENOUS ONCE
Status: COMPLETED | OUTPATIENT
Start: 2020-08-03 | End: 2020-08-03

## 2020-08-03 RX ORDER — FENTANYL CITRATE 50 UG/ML
50 INJECTION, SOLUTION INTRAMUSCULAR; INTRAVENOUS ONCE
Status: COMPLETED | OUTPATIENT
Start: 2020-08-03 | End: 2020-08-03

## 2020-08-03 RX ORDER — LORAZEPAM 2 MG/ML
1 INJECTION INTRAMUSCULAR EVERY 4 HOURS PRN
Status: DISCONTINUED | OUTPATIENT
Start: 2020-08-03 | End: 2020-08-04 | Stop reason: HOSPADM

## 2020-08-03 RX ORDER — SODIUM CHLORIDE 0.9 % (FLUSH) 0.9 %
10 SYRINGE (ML) INJECTION AS NEEDED
Status: DISCONTINUED | OUTPATIENT
Start: 2020-08-03 | End: 2020-08-04 | Stop reason: HOSPADM

## 2020-08-03 RX ORDER — SODIUM CHLORIDE 0.9 % (FLUSH) 0.9 %
10 SYRINGE (ML) INJECTION EVERY 12 HOURS SCHEDULED
Status: DISCONTINUED | OUTPATIENT
Start: 2020-08-03 | End: 2020-08-04 | Stop reason: HOSPADM

## 2020-08-03 RX ORDER — LEVETIRACETAM 10 MG/ML
1000 INJECTION INTRAVASCULAR ONCE
Status: COMPLETED | OUTPATIENT
Start: 2020-08-03 | End: 2020-08-03

## 2020-08-03 RX ORDER — GADOTERATE MEGLUMINE 376.9 MG/ML
15 INJECTION INTRAVENOUS
Status: COMPLETED | OUTPATIENT
Start: 2020-08-04 | End: 2020-08-04

## 2020-08-03 RX ORDER — LORAZEPAM 2 MG/ML
INJECTION INTRAMUSCULAR
Status: COMPLETED
Start: 2020-08-03 | End: 2020-08-03

## 2020-08-03 RX ADMIN — FENTANYL CITRATE 50 MCG: 0.05 INJECTION, SOLUTION INTRAMUSCULAR; INTRAVENOUS at 15:34

## 2020-08-03 RX ADMIN — SODIUM CHLORIDE 1000 ML: 9 INJECTION, SOLUTION INTRAVENOUS at 15:35

## 2020-08-03 RX ADMIN — Medication 10 ML: at 23:28

## 2020-08-03 RX ADMIN — KETOROLAC TROMETHAMINE 15 MG: 15 INJECTION, SOLUTION INTRAMUSCULAR; INTRAVENOUS at 17:52

## 2020-08-03 RX ADMIN — LORAZEPAM 1 MG: 2 INJECTION INTRAMUSCULAR; INTRAVENOUS at 15:14

## 2020-08-03 RX ADMIN — LEVETIRACETAM INJECTION 1000 MG: 10 INJECTION INTRAVENOUS at 15:33

## 2020-08-03 RX ADMIN — SODIUM CHLORIDE 1000 ML: 9 INJECTION, SOLUTION INTRAVENOUS at 17:51

## 2020-08-03 NOTE — H&P
"    [facility] Medicine Services  HISTORY AND PHYSICAL    Patient Name: Henry Ashley  : 1985  MRN: [id number]  Primary Care Physician: Ernesto Wade MD  Date of admission: 8/3/2020      Subjective   Subjective     Chief Complaint:  seizures    HPI:  Henry Ashley is a 35 y.o. male with no known past medical history presents [facility] secondary to seizure.  The patient's father, who is a pediatrician, was present today for a witnessed seizure.  The patient's father was helping him with household activity and putting a screw and the door when the patient bent over and collapsed on his side.  The father reports that her arms were clenched and he had a full \"tonic-clonic\" seizure.  The father states that the seizure lasted approximately 3 minutes with teeth clenching and 15-minute post ictal period.  The father reports today while in the ER he sat up and had another witnessed tonic-clonic grand mal seizure that lasted approximately 1 minute.  The end of the seizure was witnessed by ER staff.  The patient received medications in the ED.    According to the patient for the past 2 nights he has had intermittent neck pain and headache.  The patient reports on 2 nights ago he had woken up on the floor and there was sweat on his pillow.  He is unsure how he got to the floor or what happened.  He does not recall any event.  He states he was in his normal state of health last week.  He endorses a history of migraines however states he has not had a migraine in 2 to 3 years.  He has no history of seizure disorder.    He reports recently being started on Wellbutrin approximately 2 weeks ago by primary care for depression.  He endorses occasional alcohol use and says he drinks between 1 and 5 drinks approximately 3-4 times per week.  He denies feeling the need to drink daily.  He does not drink daily.  He has never had withdrawal from alcohol.  He denies a history of tobacco " abuse.  He does a endorse a history of marijuana use.  He states he uses marijuana 3-4 times a week.  He also endorses approximately 2 days ago he took several Xanax tablets.  He states that he does not take Xanax frequently and does not take it regularly.      Upon arrival to the ER the patient's vital signs are stable.  His procalcitonin was normal.  His white blood cell count was 20.14 with neutrophil predominance.  He also had an elevated anion gap of 17 with mild elevations of his AST and ALT.  He underwent lumbar puncture which revealed a elevated RBC and tubes 1 and 4.  White blood cell count was normal of CSF.  Glucose was mildly elevated and CSF.  Protein count was normal.Urine drug screen was positive for marijuana.        Review of Systems   Constitutional: Negative for chills, diaphoresis and fatigue.   Eyes: Positive for photophobia.   Respiratory: Negative for cough, chest tightness and shortness of breath.    Gastrointestinal: Negative for abdominal pain, constipation, nausea and vomiting.   Genitourinary: Negative for dysuria, frequency and hematuria.   Musculoskeletal: Negative for back pain and gait problem.   Neurological: Positive for seizures and headaches. Negative for dizziness, facial asymmetry, light-headedness and numbness.   All other systems reviewed and are negative.        Personal History     Past Medical History:   Diagnosis Date   • Acne    • Acute reaction to stress     Situational Stress   • History of attention deficit disorder    • Knee injury    • Proctitis        Past Surgical History:   Procedure Laterality Date   • WISDOM TOOTH EXTRACTION         Family History: family history is not on file. Otherwise pertinent FHx was reviewed and unremarkable.     Social History:  reports that he has never smoked. He has never used smokeless tobacco. He reports that he drinks alcohol. He reports that he does not use drugs.  Social History     Social History Narrative   • Not on file        Medications:  Available home medication information reviewed.    (Not in a hospital admission)    No Known Allergies    Objective   Objective     Vital Signs:   Temp:  [99 °F (37.2 °C)] 99 °F (37.2 °C)  Heart Rate:  [] 117  Resp:  [20] 20  BP: (111-152)/(62-93) 129/62        Physical Exam   Constitutional: He is oriented to person, place, and time. He appears well-developed and well-nourished. No distress.   HENT:   Head: Normocephalic and atraumatic.   Right Ear: External ear normal.   Left Ear: External ear normal.   Mouth/Throat: Mucous membranes are dry.   Eyes: Pupils are equal, round, and reactive to light. Conjunctivae and EOM are normal.   Neck: Normal range of motion.   Cardiovascular: Normal rate and regular rhythm.   Pulmonary/Chest: Effort normal. No stridor. He has no wheezes. He has no rales.   Abdominal: Soft. He exhibits no mass. There is no tenderness. There is no guarding.   Musculoskeletal: Normal range of motion. He exhibits no edema.   Neurological: He is alert and oriented to person, place, and time. No cranial nerve deficit. He exhibits normal muscle tone. Coordination normal.   5/5 strength in all 4 extremities.   Skin: Skin is warm and dry. No rash noted. He is not diaphoretic. No erythema. No pallor.   Psychiatric: He has a normal mood and affect. His behavior is normal.   Nursing note and vitals reviewed.         Results Reviewed:  I have personally reviewed current lab and radiology data.    Results from last 7 days   Lab Units 08/03/20  1431   WBC 10*3/mm3 20.14*   HEMOGLOBIN g/dL 12.0*   HEMATOCRIT % 34.8*   PLATELETS 10*3/mm3 259     Results from last 7 days   Lab Units 08/03/20  1431   SODIUM mmol/L 133*   POTASSIUM mmol/L 3.4*   CHLORIDE mmol/L 95*   CO2 mmol/L 21.0*   BUN mg/dL 9   CREATININE mg/dL 1.23   GLUCOSE mg/dL 186*   CALCIUM mg/dL 9.2   ALT (SGPT) U/L 46*   AST (SGOT) U/L 95*   PROCALCITONIN ng/mL 0.09     Estimated Creatinine Clearance: 100.4 mL/min (by C-G  formula based on SCr of 1.23 mg/dL).  Brief Urine Lab Results     None        Imaging Results (Last 24 Hours)     Procedure Component Value Units Date/Time    CT Head Without Contrast [[id number]] Collected:  08/03/20 1521     Updated:  08/03/20 1729    Narrative:       EXAMINATION: CT HEAD WO CONTRAST-08/03/2020:      INDICATION: HA, seizure.     TECHNIQUE: Axial CT of the head with coronal reformat.     The radiation dose reduction device was turned on for each scan per the  ALARA (As Low as Reasonably Achievable) protocol.     COMPARISON: NONE.     FINDINGS: No acute hemorrhage, mass or mass effect. Gray-white  differentiation is maintained without evidence of territorial infarct.  Unremarkable ventricular configuration and caliber. Normal appearance of  the orbits. The paranasal sinuses are grossly clear.       Impression:       No acute intracranial abnormality.     D:  08/03/2020  E:  08/03/2020           This report was finalized on 8/3/2020 5:26 PM by Sal Patrick.                Assessment/Plan   Assessment & Plan     Active Hospital Problems    Diagnosis POA   • Seizure (CMS/[facility]) [R56.9] Yes       Henry Ashley is a 35 y.o. male with no known past medical history presents to [facility] secondary to seizure.  The patient's father, who is a pediatrician, was present today for a witnessed seizure, one prior to ER visit and one in ER.  The father who is pediatrician witnessed the seizures and cofirms they were real appearing with post ictal period.  The patient had LP in ER with negative results, CT head negative.  He does have elevated WBC of 20.  UDS positive for marijuana.  Will be admitted for observation, Neuro consult and MRI.    New onset multiple seizures  Headache  Hx of migraines  -witnessed by physician father and ED staff  - S/p keppra load; CT head negative; CSF with no acute findings   - Ativan PRN  -Seizure precautions  - obtain EEG and MRI   - Neuro consult in AM   -tylenol  PRN  -He reports recently being started on Wellbutrin approximately 2 weeks ago by primary care for depression.  He endorses occasional alcohol use and says he drinks between 1 and 5 drinks approximately 3-4 times per week.    -THC positive  -f/u on CSF culture    Leukocytosis   - likely reactive; no symptoms of infection      DVT prophylaxis:  SCD      CODE STATUS:    Code Status and Medical Interventions:   Ordered at: 08/03/20 1828     Level Of Support Discussed With:    Patient     Code Status:    CPR     Medical Interventions (Level of Support Prior to Arrest):    Full       Admission Status:  I believe this patient meets OBSERVATION status, however if further evaluation or treatment plans warrant, status may change.  Based upon current information, I predict patient's care encounter to be less than or equal to 2 midnights.      Electronically signed by Christina Garay PA-C, 08/03/20, 6:23 PM.      Attending   Admission Attestation       I have seen and examined the patient, performing an independent face-to-face diagnostic evaluation with plan of care reviewed and developed with the advanced practice clinician (APC).      Brief Summary Statement:   Henry Ashley is a 35 y.o. male recent treatment for depression with Wellbutrin who presents with witness seizures. Seizure at home lasted about 3 minutes and witnessed by his father who is a Pediatrician. He had another episode in the ED. Patient received Ativan and Keppra load. Currently states he feels ok except for back pain which he attributes to the bed. They are anxious to get out of the ER. Question additional testing and if they need to stay the night.     Remainder of detailed HPI is as noted by APC and has been reviewed and/or edited by me for completeness.    Attending Physical Exam:  Constitutional: Awake, alert  Eyes: PERRLA, sclerae anicteric, no conjunctival injection  HENT: NCAT, mucous membranes moist  Neck: Supple, no thyromegaly, no  lymphadenopathy, trachea midline  Respiratory: Clear to auscultation bilaterally, nonlabored respirations   Cardiovascular: RRR, no murmurs, rubs, or gallops, palpable pedal pulses bilaterally  Gastrointestinal: Positive bowel sounds, soft, nontender, nondistended  Musculoskeletal: No bilateral ankle edema, no clubbing or cyanosis to extremities  Psychiatric: Appropriate affect, cooperative  Neurologic: Oriented x 3, strength symmetric in all extremities, Cranial Nerves grossly intact to confrontation, speech clear  Skin: No rashes      Brief Assessment/Plan :  See detailed assessment and plan developed with APC which I have reviewed and/or edited for completeness.    Electronically signed by Sonam Agarwal DO, 08/03/20, 10:41 PM.

## 2020-08-03 NOTE — ED PROVIDER NOTES
EMERGENCY DEPARTMENT ENCOUNTER    Room Number:  S344/1  Date of encounter:  8/4/2020  PCP: Ernesto Wade MD  Historian: Patient and father      HPI:  Chief Complaint: Seizure, new onset    A complete HPI/ROS/PMH/PSH/SH/FH are unobtainable due to: Possible mild postictal state    Context: Henry Ashley is a 35 y.o. male who presents to the ED c/o new onset seizure.  Patient was started on Wellbutrin approximately 2 weeks ago for anxiety.  Approximately 2 days ago he began to experience a headache which he states is constant with intermittent severe pain.  He has a history of migraines but he states that this headache is different.  He is unsure of the onset as to whether it was gradual or rapid.  Today his father was with him approximately 1 PM, the patient was acting normally and having full conversation with no real complaint of headache.  The patient bent over and began to have a tonic-clonic seizure episode.  The father states that his tongue was sticking out and his teeth were clenched.  The patient was diaphoretic during the episode.  Both sides of his body were shaking and the episode lasted approximately 3 minutes.  Following the episode there was a period of fatigue and confusion that lasted approximately 25 minutes.  Upon arrival to the emergency department patient symptoms had improved.  I saw the patient on arrival back to his room from CT.  Immediately upon arriving back in his room CT he had another tonic-clonic seizure.  This 1 lasted approximately 1 minute.  The patient's heart rate went to 150, he had significant diaphoresis, and biting of his tongue.  The seizure was also tonic-clonic nature.  Patient's alertness significantly improved immediately after the seizure but his confusion persisted for 15 to 30 minutes.    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  Due to his increased stress he was started on  Wellbutrin approximately 2 weeks ago.  The Wellbutrin is possible trigger for seizures, combined with the current stress he is experiencing.  As noted above, he has no history of seizures prior to this.  Patient also states as part of the history that he woke up overnight on the floor beside his bed.  He states that his pillow was supple with.  I suspect that he had another episode in his bed overnight, which would be 3 seizures over the past 18 hours.    He denies chest pain, shortness of breath, abdominal pain, nausea, or vomiting.  Other than headaches, and has no other complaints.  No definitive fever prior to arrival.      PAST MEDICAL HISTORY  Active Ambulatory Problems     Diagnosis Date Noted   • Attention or concentration deficit 05/31/2016     Resolved Ambulatory Problems     Diagnosis Date Noted   • No Resolved Ambulatory Problems     Past Medical History:   Diagnosis Date   • Acne    • Acute reaction to stress    • History of attention deficit disorder    • Knee injury    • Proctitis          PAST SURGICAL HISTORY  Past Surgical History:   Procedure Laterality Date   • WISDOM TOOTH EXTRACTION           FAMILY HISTORY  History reviewed. No pertinent family history.      SOCIAL HISTORY  Social History     Socioeconomic History   • Marital status: Single     Spouse name: Not on file   • Number of children: Not on file   • Years of education: Not on file   • Highest education level: Not on file   Tobacco Use   • Smoking status: Never Smoker   • Smokeless tobacco: Never Used   Substance and Sexual Activity   • Alcohol use: Yes     Comment: 5-10 drinks a week   • Drug use: No         ALLERGIES  Patient has no known allergies.        REVIEW OF SYSTEMS  Review of Systems   Per HPI  All systems reviewed and negative except for those discussed in HPI.       PHYSICAL EXAM    I have reviewed the triage vital signs and nursing notes.    ED Triage Vitals [08/03/20 1359]   Temp Heart Rate Resp BP SpO2   99 °F (37.2 °C)  106 20 152/93 96 %      Temp src Heart Rate Source Patient Position BP Location FiO2 (%)   Oral Monitor Sitting Right arm --       Physical Exam  GENERAL: Pt has been seizure.  Seizure is currently resolving.  Pt is very diaphoretic and tachycardic. Well developed.   HENT: Nares patent. Abrasions on tongue consistent with seizures.  No external signs of recent trauma.   EYES: No scleral icterus  CV: Regular rhyth.  Tachycardia  RESPIRATORY: Normal effort.  No audible wheezes, rales or rhonchi  ABDOMEN: Soft, nontender  MUSCULOSKELETAL: No deformities.   NEURO: Alert, moves all extremities, follows commands.  Pt is alert, but confused.  Consistent with expected post-ictal state. Kernig, Brudzinski negative   SKIN: Warm, no rash visualized.  Diaphoretic.        LAB RESULTS  Recent Results (from the past 24 hour(s))   Comprehensive Metabolic Panel    Collection Time: 08/03/20  2:31 PM   Result Value Ref Range    Glucose 186 (H) 65 - 99 mg/dL    BUN 9 6 - 20 mg/dL    Creatinine 1.23 0.76 - 1.27 mg/dL    Sodium 133 (L) 136 - 145 mmol/L    Potassium 3.4 (L) 3.5 - 5.2 mmol/L    Chloride 95 (L) 98 - 107 mmol/L    CO2 21.0 (L) 22.0 - 29.0 mmol/L    Calcium 9.2 8.6 - 10.5 mg/dL    Total Protein 8.0 6.0 - 8.5 g/dL    Albumin 5.00 3.50 - 5.20 g/dL    ALT (SGPT) 46 (H) 1 - 41 U/L    AST (SGOT) 95 (H) 1 - 40 U/L    Alkaline Phosphatase 55 39 - 117 U/L    Total Bilirubin 0.7 0.0 - 1.2 mg/dL    eGFR Non African Amer 67 >60 mL/min/1.73    Globulin 3.0 gm/dL    A/G Ratio 1.7 g/dL    BUN/Creatinine Ratio 7.3 7.0 - 25.0    Anion Gap 17.0 (H) 5.0 - 15.0 mmol/L   Light Blue Top    Collection Time: 08/03/20  2:31 PM   Result Value Ref Range    Extra Tube hold for add-on    Green Top (Gel)    Collection Time: 08/03/20  2:31 PM   Result Value Ref Range    Extra Tube Hold for add-ons.    Lavender Top    Collection Time: 08/03/20  2:31 PM   Result Value Ref Range    Extra Tube hold for add-on    Gold Top - SST    Collection Time: 08/03/20   2:31 PM   Result Value Ref Range    Extra Tube Hold for add-ons.    CBC Auto Differential    Collection Time: 08/03/20  2:31 PM   Result Value Ref Range    WBC 20.14 (H) 3.40 - 10.80 10*3/mm3    RBC 4.22 4.14 - 5.80 10*6/mm3    Hemoglobin 12.0 (L) 13.0 - 17.7 g/dL    Hematocrit 34.8 (L) 37.5 - 51.0 %    MCV 82.5 79.0 - 97.0 fL    MCH 28.4 26.6 - 33.0 pg    MCHC 34.5 31.5 - 35.7 g/dL    RDW 17.2 (H) 12.3 - 15.4 %    RDW-SD 51.7 37.0 - 54.0 fl    MPV 9.1 6.0 - 12.0 fL    Platelets 259 140 - 450 10*3/mm3    Neutrophil % 88.8 (H) 42.7 - 76.0 %    Lymphocyte % 3.4 (L) 19.6 - 45.3 %    Monocyte % 7.2 5.0 - 12.0 %    Eosinophil % 0.0 (L) 0.3 - 6.2 %    Basophil % 0.1 0.0 - 1.5 %    Immature Grans % 0.5 0.0 - 0.5 %    Neutrophils, Absolute 17.88 (H) 1.70 - 7.00 10*3/mm3    Lymphocytes, Absolute 0.68 (L) 0.70 - 3.10 10*3/mm3    Monocytes, Absolute 1.46 (H) 0.10 - 0.90 10*3/mm3    Eosinophils, Absolute 0.00 0.00 - 0.40 10*3/mm3    Basophils, Absolute 0.02 0.00 - 0.20 10*3/mm3    Immature Grans, Absolute 0.10 (H) 0.00 - 0.05 10*3/mm3    nRBC 0.0 0.0 - 0.2 /100 WBC   Procalcitonin    Collection Time: 08/03/20  2:31 PM   Result Value Ref Range    Procalcitonin 0.09 0.00 - 0.25 ng/mL   Protein, CSF - Cerebrospinal Fluid, Lumbar Puncture    Collection Time: 08/03/20  4:31 PM   Result Value Ref Range    Protein, Total (CSF) 38.3 15.0 - 45.0 mg/dL   Glucose, CSF - Cerebrospinal Fluid, Lumbar Puncture    Collection Time: 08/03/20  4:31 PM   Result Value Ref Range    Glucose, CSF 98 (H) 40 - 70 mg/dL   Culture, CSF - Cerebrospinal Fluid, Lumbar Puncture    Collection Time: 08/03/20  4:31 PM   Result Value Ref Range    Gram Stain No WBCs or organisms seen    CSF Tube - Cerebrospinal Fluid, Lumbar Puncture    Collection Time: 08/03/20  4:31 PM   Result Value Ref Range    Extra Tube     Cell Count, CSF - Cerebrospinal Fluid, Lumbar Puncture    Collection Time: 08/03/20  4:31 PM   Result Value Ref Range    WBC, CSF 0 0 - 5 /mm3    RBC,   (H) 0 - 5 /mm3    Color, CSF Colorless Colorless    Supernatant Color, CSF Colorless Colorless    Appearance, CSF Clear Clear    Volume, CSF 9.5 mL    Tube Number, CSF 4    Cell Count, CSF - Cerebrospinal Fluid, Lumbar Puncture    Collection Time: 08/03/20  4:31 PM   Result Value Ref Range    WBC, CSF 0 0 - 5 /mm3    RBC,  (H) 0 - 5 /mm3    Color, CSF Colorless Colorless    Supernatant Color, CSF Colorless Colorless    Appearance, CSF Clear Clear    Volume, CSF 9.5 mL    Tube Number, CSF 1    Urine Drug Screen - Urine, Clean Catch    Collection Time: 08/03/20  5:59 PM   Result Value Ref Range    THC, Screen, Urine Positive (A) Negative    Phencyclidine (PCP), Urine Negative Negative    Cocaine Screen, Urine Negative Negative    Methamphetamine, Ur Negative Negative    Opiate Screen Negative Negative    Amphetamine Screen, Urine Negative Negative    Benzodiazepine Screen, Urine Negative Negative    Tricyclic Antidepressants Screen Negative Negative    Methadone Screen, Urine Negative Negative    Barbiturates Screen, Urine Negative Negative    Oxycodone Screen, Urine Negative Negative    Propoxyphene Screen Negative Negative    Buprenorphine, Screen, Urine Negative Negative       Ordered the above labs and independently reviewed the results.        RADIOLOGY  Ct Head Without Contrast    Result Date: 8/3/2020  EXAMINATION: CT HEAD WO CONTRAST-08/03/2020:  INDICATION: HA, seizure.  TECHNIQUE: Axial CT of the head with coronal reformat.  The radiation dose reduction device was turned on for each scan per the ALARA (As Low as Reasonably Achievable) protocol.  COMPARISON: NONE.  FINDINGS: No acute hemorrhage, mass or mass effect. Gray-white differentiation is maintained without evidence of territorial infarct. Unremarkable ventricular configuration and caliber. Normal appearance of the orbits. The paranasal sinuses are grossly clear.      No acute intracranial abnormality.  D:  08/03/2020 E:  08/03/2020     This report was finalized on 8/3/2020 5:26 PM by Sal Patrick.      Mri Brain With & Without Contrast    Result Date: 8/3/2020  MRI Brain WO W INDICATION:  New onset seizure activity that began 2 weeks ago. Headache x2 days. Seizure today. No prior stroke. Brain MRI: No hemorrhage. No acute stroke. No mass lesion. No significant hydrocephalus. This is a preliminary wet read by Dr. Fritz Hopkins at 2323 hours. Final interpretation will be provided by neuroradiology. Please refer to final interpretation for detailed findings and any further recommendations.       I ordered and reviewed the above noted radiographic studies.    I viewed images of CT Head which showed No acute abnormality per my independent interpretation.  See radiologist's dictation for official interpretation.        PROCEDURES    Lumbar Puncture  Date/Time: 8/3/2020 5:02 PM  Performed by: Mick Singh DO  Authorized by: Mick Singh DO     Consent:     Consent obtained:  Written    Consent given by:  Patient and parent    Risks discussed:  Bleeding, infection, pain, repeat procedure, nerve damage and headache    Alternatives discussed:  No treatment, delayed treatment and observation  Pre-procedure details:     Procedure purpose:  Diagnostic    Preparation: Patient was prepped and draped in usual sterile fashion    Sedation:     Sedation type:  Anxiolysis  Anesthesia (see MAR for exact dosages):     Anesthesia method:  Local infiltration    Local anesthetic:  Lidocaine 1% w/o epi  Procedure details:     Lumbar space:  L4-L5 interspace    Patient position:  L lateral decubitus    Needle gauge:  22    Needle type:  Maddy point    Needle length (in):  3.5    Ultrasound guidance: no      Number of attempts:  1    Opening pressure (cm H2O):  16    Fluid appearance:  Clear    Tubes of fluid:  4    Total volume (ml):  9  Post-procedure:     Puncture site:  Adhesive bandage applied and direct pressure applied    Patient tolerance of procedure:   Tolerated well, no immediate complications          MEDICATIONS GIVEN IN ER    Medications   sodium chloride 0.9 % flush 10 mL (has no administration in time range)   sodium chloride 0.9 % flush 10 mL (10 mL Intravenous Given 8/3/20 2328)   sodium chloride 0.9 % flush 10 mL (has no administration in time range)   ondansetron (ZOFRAN) injection 4 mg (has no administration in time range)   LORazepam (ATIVAN) injection 1 mg (has no administration in time range)   LORazepam (ATIVAN) 2 MG/ML injection  - ADS Override Pull (1 mg  Given 8/3/20 1514)   levETIRAcetam in NaCl 0.75% (KEPPRA) IVPB 1,000 mg (0 mg Intravenous Stopped 8/3/20 1628)   sodium chloride 0.9 % bolus 1,000 mL (0 mL Intravenous Stopped 8/3/20 1752)   fentaNYL citrate (PF) (SUBLIMAZE) injection 50 mcg (50 mcg Intravenous Given 8/3/20 1534)   ketorolac (TORADOL) injection 15 mg (15 mg Intravenous Given 8/3/20 1752)   sodium chloride 0.9 % bolus 1,000 mL (0 mL Intravenous Stopped 8/3/20 2008)   Gadoterate Meglumine (CLARISCAN) solution 15 mL (15 mL Intravenous Given 8/4/20 0000)         PROGRESS, DATA ANALYSIS, CONSULTS, AND MEDICAL DECISION MAKING    All labs have been independently reviewed by me.  All radiology studies have been reviewed by me and the radiologist dictating the report.   EKG's have been independently viewed and interpreted by me.                       AS OF 00:02 VITALS:    BP - 130/76  HR - 97  TEMP - 98.5 °F (36.9 °C) (Oral)  O2 SATS - 99%        DIAGNOSIS  Final diagnoses:   Seizures (CMS/HCC)   Acute nonintractable headache, unspecified headache type         DISPOSITION  Admit           Mick Singh DO  08/04/20 0002

## 2020-08-04 ENCOUNTER — READMISSION MANAGEMENT (OUTPATIENT)
Dept: CALL CENTER | Facility: HOSPITAL | Age: 35
End: 2020-08-04

## 2020-08-04 ENCOUNTER — APPOINTMENT (OUTPATIENT)
Dept: NEUROLOGY | Facility: HOSPITAL | Age: 35
End: 2020-08-04

## 2020-08-04 VITALS
OXYGEN SATURATION: 97 % | WEIGHT: 183.5 LBS | HEART RATE: 79 BPM | HEIGHT: 75 IN | SYSTOLIC BLOOD PRESSURE: 133 MMHG | DIASTOLIC BLOOD PRESSURE: 72 MMHG | TEMPERATURE: 98.6 F | BODY MASS INDEX: 22.81 KG/M2 | RESPIRATION RATE: 16 BRPM

## 2020-08-04 LAB
ALBUMIN SERPL-MCNC: 4.6 G/DL (ref 3.5–5.2)
ALBUMIN/GLOB SERPL: 2 G/DL
ALP SERPL-CCNC: 51 U/L (ref 39–117)
ALT SERPL W P-5'-P-CCNC: 49 U/L (ref 1–41)
ANION GAP SERPL CALCULATED.3IONS-SCNC: 11 MMOL/L (ref 5–15)
AST SERPL-CCNC: 127 U/L (ref 1–40)
BASOPHILS # BLD AUTO: 0.02 10*3/MM3 (ref 0–0.2)
BASOPHILS NFR BLD AUTO: 0.2 % (ref 0–1.5)
BILIRUB SERPL-MCNC: 0.8 MG/DL (ref 0–1.2)
BUN SERPL-MCNC: 7 MG/DL (ref 6–20)
BUN/CREAT SERPL: 9.1 (ref 7–25)
C GATTII+NEOFOR DNA CSF QL NAA+NON-PROBE: NOT DETECTED
CALCIUM SPEC-SCNC: 8.3 MG/DL (ref 8.6–10.5)
CHLORIDE SERPL-SCNC: 102 MMOL/L (ref 98–107)
CMV DNA CSF QL NAA+PROBE: NOT DETECTED
CO2 SERPL-SCNC: 24 MMOL/L (ref 22–29)
CREAT SERPL-MCNC: 0.77 MG/DL (ref 0.76–1.27)
DEPRECATED RDW RBC AUTO: 54.3 FL (ref 37–54)
E COLI K1 DNA CSF QL NAA+NON-PROBE: NOT DETECTED
EOSINOPHIL # BLD AUTO: 0.06 10*3/MM3 (ref 0–0.4)
EOSINOPHIL NFR BLD AUTO: 0.5 % (ref 0.3–6.2)
ERYTHROCYTE [DISTWIDTH] IN BLOOD BY AUTOMATED COUNT: 17.4 % (ref 12.3–15.4)
EV RNA CSF QL NAA+PROBE: NOT DETECTED
GFR SERPL CREATININE-BSD FRML MDRD: 116 ML/MIN/1.73
GLOBULIN UR ELPH-MCNC: 2.3 GM/DL
GLUCOSE SERPL-MCNC: 105 MG/DL (ref 65–99)
GP B STREP DNA SPEC QL NAA+PROBE: NOT DETECTED
HAEM INFLU SEROTYP DNA SPEC NAA+PROBE: NOT DETECTED
HCT VFR BLD AUTO: 32.3 % (ref 37.5–51)
HGB BLD-MCNC: 10.7 G/DL (ref 13–17.7)
HHV6 DNA CSF QL NAA+PROBE: NOT DETECTED
HSV1 DNA CSF QL NAA+PROBE: NOT DETECTED
HSV2 DNA CSF QL NAA+PROBE: NOT DETECTED
IMM GRANULOCYTES # BLD AUTO: 0.07 10*3/MM3 (ref 0–0.05)
IMM GRANULOCYTES NFR BLD AUTO: 0.6 % (ref 0–0.5)
L MONOCYTOG RRNA SPEC QL PROBE: NOT DETECTED
LYMPHOCYTES # BLD AUTO: 1.85 10*3/MM3 (ref 0.7–3.1)
LYMPHOCYTES NFR BLD AUTO: 16.1 % (ref 19.6–45.3)
MCH RBC QN AUTO: 28.2 PG (ref 26.6–33)
MCHC RBC AUTO-ENTMCNC: 33.1 G/DL (ref 31.5–35.7)
MCV RBC AUTO: 85.2 FL (ref 79–97)
MONOCYTES # BLD AUTO: 1.24 10*3/MM3 (ref 0.1–0.9)
MONOCYTES NFR BLD AUTO: 10.8 % (ref 5–12)
N MEN DNA SPEC QL NAA+PROBE: NOT DETECTED
NEUTROPHILS NFR BLD AUTO: 71.8 % (ref 42.7–76)
NEUTROPHILS NFR BLD AUTO: 8.23 10*3/MM3 (ref 1.7–7)
NRBC BLD AUTO-RTO: 0 /100 WBC (ref 0–0.2)
PARECHOVIRUS A RNA CSF QL NAA+NON-PROBE: NOT DETECTED
PLATELET # BLD AUTO: 223 10*3/MM3 (ref 140–450)
PMV BLD AUTO: 9.5 FL (ref 6–12)
POTASSIUM SERPL-SCNC: 3.2 MMOL/L (ref 3.5–5.2)
PROT SERPL-MCNC: 6.9 G/DL (ref 6–8.5)
RBC # BLD AUTO: 3.79 10*6/MM3 (ref 4.14–5.8)
S PNEUM DNA CSF QL NAA+NON-PROBE: NOT DETECTED
SODIUM SERPL-SCNC: 137 MMOL/L (ref 136–145)
VZV DNA CSF QL NAA+PROBE: NOT DETECTED
WBC # BLD AUTO: 11.47 10*3/MM3 (ref 3.4–10.8)

## 2020-08-04 PROCEDURE — 95816 EEG AWAKE AND DROWSY: CPT

## 2020-08-04 PROCEDURE — G0378 HOSPITAL OBSERVATION PER HR: HCPCS

## 2020-08-04 PROCEDURE — 99244 OFF/OP CNSLTJ NEW/EST MOD 40: CPT | Performed by: PSYCHIATRY & NEUROLOGY

## 2020-08-04 PROCEDURE — 99217 PR OBSERVATION CARE DISCHARGE MANAGEMENT: CPT | Performed by: FAMILY MEDICINE

## 2020-08-04 PROCEDURE — 80053 COMPREHEN METABOLIC PANEL: CPT | Performed by: INTERNAL MEDICINE

## 2020-08-04 PROCEDURE — 85025 COMPLETE CBC W/AUTO DIFF WBC: CPT | Performed by: INTERNAL MEDICINE

## 2020-08-04 PROCEDURE — 25010000002 GADOTERATE MEGLUMINE 7.5 MMOL/15ML SOLUTION: Performed by: INTERNAL MEDICINE

## 2020-08-04 PROCEDURE — A9575 INJ GADOTERATE MEGLUMI 0.1ML: HCPCS | Performed by: INTERNAL MEDICINE

## 2020-08-04 RX ORDER — LEVETIRACETAM 500 MG/1
500 TABLET ORAL 2 TIMES DAILY
Qty: 6 TABLET | Refills: 0 | Status: SHIPPED | OUTPATIENT
Start: 2020-08-04 | End: 2020-08-07

## 2020-08-04 RX ORDER — LEVETIRACETAM 500 MG/1
500 TABLET ORAL 2 TIMES DAILY
Status: DISCONTINUED | OUTPATIENT
Start: 2020-08-04 | End: 2020-08-04 | Stop reason: HOSPADM

## 2020-08-04 RX ADMIN — Medication 10 ML: at 08:49

## 2020-08-04 RX ADMIN — LEVETIRACETAM 500 MG: 500 TABLET ORAL at 10:38

## 2020-08-04 RX ADMIN — GADOTERATE MEGLUMINE 15 ML: 376.9 INJECTION, SOLUTION INTRAVENOUS at 00:00

## 2020-08-04 NOTE — CONSULTS
Referring Provider: Jarrod  Reason for Consultation: Gabi   Patient Care Team:  Ernesto Wade MD as PCP - General (Internal Medicine)    Chief complaint seizure    Subjective .     History of present illness:   Henry Ashley is a 35-year-old man with a history of depression/anxiety who was admitted for new onset seizure.  History obtained from patient and his father who is at bedside.    Patient has been on Wellbutrin for 2 weeks.  Yesterday his father came to visit him and witnessed a 3-minute generalized tonic-clonic seizure with tongue biting no bladder incontinence.  He was confused after the seizure.  Presented to the emergency room and had a another seizure that was only witnessed by the father.  Father said it was same generalized tonic-clonic activity, lasting about a minute.    Patient is awake and alert this morning.  He has no recollection of yesterday's events.  Denies any pain, weakness, numbness.      Review of Systems  Patient denies any fevers or chills, no recent trauma, no upper respiratory tract infection symptoms, no difficulty with speech or swallow, remaining ROS negative except as noted in HPI    History  Past Medical History:   Diagnosis Date   • Acne    • Acute reaction to stress     Situational Stress   • History of attention deficit disorder    • Knee injury    • Proctitis    ,   Past Surgical History:   Procedure Laterality Date   • WISDOM TOOTH EXTRACTION     , History reviewed. No pertinent family history.,   Social History     Tobacco Use   • Smoking status: Never Smoker   • Smokeless tobacco: Never Used   Substance Use Topics   • Alcohol use: Yes     Comment: 5-10 drinks a week   • Drug use: No   ,   Medications Prior to Admission   Medication Sig Dispense Refill Last Dose   • buPROPion XL (WELLBUTRIN XL) 150 MG 24 hr tablet Take 1 tablet by mouth Daily. 30 tablet 5 Unknown at Unknown time   , Scheduled Meds:    levETIRAcetam 500 mg Oral BID   sodium chloride 10 mL  "Intravenous Q12H   , Continuous Infusions:   , PRN Meds:  LORazepam  •  ondansetron  •  sodium chloride  •  sodium chloride and Allergies:  Patient has no known allergies.    Objective     Vital Signs   Blood pressure 120/75, pulse 76, temperature 98.6 °F (37 °C), temperature source Oral, resp. rate 16, height 190.5 cm (75\"), weight 83.2 kg (183 lb 8 oz), SpO2 99 %.    Physical Exam:  General-well developed adult male, lying in bed, no distress  Head/eyes-atraumatic, pupils equal and reactive  ENT-small tongue laceration on the left side, otherwise moist membranes  Neck-supple, no meningismus  Respiratory-on room air, even respirations  Cardiac-regular rate and rhythm, no edema  Abdomen- distended  Extremities-normal inspection and temperature  Musculoskeletal-normal range of motion, no joint deformity  Skin-warm dry intact  Speech-normal speech, no aphasia or dysarthria  Cranial nerves-pupils equal reactive to light, EOMI, no facial weakness, sensation intact to light touch, hearing intact, tongue midline, palate elevates symmetrically  Mental status-she is oriented to person place and time  Cognitive status-she attends to me well and offers detailed history, normal fund of knowledge and concentration  Motor-normal bulk and tone, no tremor, full strength with no drift  Sensation-intact to light touch  Reflexes 2+ patellar bilaterally, 2+ Achilles bilaterally, 2+ bicep, flexor plantar response  Cerebellar-finger-nose-finger normal in both sides, no nystagmus    Results Review:   Brain MRI with and without contrast personally reviewed, no evidence of trauma, hemorrhage, stroke, or other abnormality.  There is a nonspecific T2 flair intensity in the right adjacent to the anterior horn of the lateral ventricle.  I believe this is incidental and not a seizure focus  EEG-normal study  Labs reviewed, no evidence of infection or metabolic derangement.  UDS positive for THC     Assessment/Plan       Seizure " (CMS/[facility])    Believe this is a provoked seizure in the setting of taking Wellbutrin.  I advised that I cannot rule out an underlying seizure disorder although his EEG and MRI are reassuring.  Will take Keppra for a few days just to bridge and then can discontinue.    We discussed seizure precautions including no driving for 90 days, swimming alone, cooking over an open flame, getting on a ladder.  Essentially any activity that might cause an injury if you would have a seizure in the middle of it.    We will arrange for outpatient follow-up in our clinic.    I discussed the patient's findings and my recommendations with  patient, primary team, bedside nurse, patient's father    Kiley Turner MD  08/04/20  09:11

## 2020-08-04 NOTE — DISCHARGE SUMMARY
[facility] Medicine Services  DISCHARGE SUMMARY    Patient Name: Henry Ashley  : 1985  MRN: [id number]    Date of Admission: 8/3/2020  2:09 PM  Date of Discharge:  20  Primary Care Physician: Ernesto Wade MD    Consults     Date and Time Order Name Status Description    2020 0030 Inpatient Neurology Consult General Completed           Hospital Course     Presenting Problem:   Seizure (CMS/HCC) [R56.9]  Seizure (CMS/HCC) [R56.9]    Active Hospital Problems    Diagnosis  POA   • Seizure (CMS/HCC) [R56.9]  Yes      Resolved Hospital Problems   No resolved problems to display.          Hospital Course:  Henry Ashley is a previously healthy 35 y.o. male who was admitted to [facility] on 8/3/20 for first seizure.  He was bent over working on a storm door when his father (who is a pediatrician) saw him fall over and have a generalized T/C seizure.  The patient's father apso describes a post ictal period.  The patient was taken to the ER where he was witnessed to have another generalized TC seizure.  Upon further questioning the patient also reported finding himself on the floor 2 nights PTA without knowledge of how he got there.  The patient had been put on Wellbutrin 2 weeks prior for depression.  He has never had a seizure before.  His LP results, MRI and blood work are reassuring.    He will be discharged today after having been seen by neurology who recommends to stop Wellbutrin and cover with 3 days of Keppra 500 mg BID to bridge.  No driving for 90 days and the patient will follow up with Dr. GOLDSTEIN in the neurology clinic.    I discussed with the patient his alcohol consumption (3-6 drinks approximately 4 times per week).  I advised him to moderate his consumption.    Discharge Follow Up Recommendations for outpatient labs/diagnostics:   Follow up as directed with Dr. Turner as directed  No driving for 90 days  No risky behaviors (ladders, swimming, fire  cooking/tending)    Day of Discharge     HPI:   The patient feels well this morning.  No overnight events.      Review of Systems  No F/C  No cough/SOA  No CP  No more seizures  No abdominal pain/N/V    Vital Signs:   Temp:  [98.3 °F (36.8 °C)-99 °F (37.2 °C)] 98.6 °F (37 °C)  Heart Rate:  [] 76  Resp:  [16-20] 16  BP: (111-152)/(62-93) 120/75     Physical Exam:  Constitutional: Awake, alert  Eyes: PERRLA, sclerae anicteric, no conjunctival injection  HENT: NCAT, mucous membranes moist  Neck: Supple, no thyromegaly, no lymphadenopathy, trachea midline  Respiratory: Clear to auscultation bilaterally, nonlabored respirations   Cardiovascular: RRR, no murmurs, rubs, or gallops, palpable pedal pulses bilaterally  Gastrointestinal: Positive bowel sounds, soft, nontender, nondistended  Musculoskeletal: No bilateral ankle edema, no clubbing or cyanosis to extremities  Psychiatric: Appropriate affect, cooperative  Neurologic: Oriented x 3, strength symmetric in all extremities, Cranial Nerves grossly intact to confrontation, speech clear  Skin: No rashes      Pertinent  and/or Most Recent Results     Results from last 7 days   Lab Units 08/04/20  0819 08/03/20  1431   WBC 10*3/mm3 11.47* 20.14*   HEMOGLOBIN g/dL 10.7* 12.0*   HEMATOCRIT % 32.3* 34.8*   PLATELETS 10*3/mm3 223 259   SODIUM mmol/L 137 133*   POTASSIUM mmol/L 3.2* 3.4*   CHLORIDE mmol/L 102 95*   CO2 mmol/L 24.0 21.0*   BUN mg/dL 7 9   CREATININE mg/dL 0.77 1.23   GLUCOSE mg/dL 105* 186*   CALCIUM mg/dL 8.3* 9.2     Results from last 7 days   Lab Units 08/04/20  0819 08/03/20  1431   BILIRUBIN mg/dL 0.8 0.7   ALK PHOS U/L 51 55   ALT (SGPT) U/L 49* 46*   AST (SGOT) U/L 127* 95*           Invalid input(s): TG, LDLCALC, LDLREALC  Results from last 7 days   Lab Units 08/03/20  1431   PROCALCITONIN ng/mL 0.09       Brief Urine Lab Results     None          Microbiology Results Abnormal     Procedure Component Value - Date/Time    Culture, CSF - Cerebrospinal  Fluid, Lumbar Puncture [[id number]] Collected:  08/03/20 1631    Lab Status:  Preliminary result Specimen:  Cerebrospinal Fluid from Lumbar Puncture Updated:  08/04/20 0837     CSF Culture No growth     Gram Stain No WBCs or organisms seen          Imaging Results (All)     Procedure Component Value Units Date/Time    MRI Brain With & Without Contrast [[id number]] Collected:  08/03/20 2326     Updated:  08/04/20 0844    Narrative:       MRI Brain WO W     INDICATION:    New onset seizure activity that began 2 weeks ago. Headache x2 days. Seizure today. No prior stroke.    TECHNIQUE: Multiplanar imaging of the brain was performed with and without 20 cc of MultiHance IV contrast    FINDINGS:    Brain MRI:  No hemorrhage. No acute stroke. No mass lesion. No significant hydrocephalus.    This is a preliminary wet read by Dr. Fritz Hopkins at 2323 hours. Final interpretation will be provided by neuroradiology. Please refer to final interpretation for detailed findings and any further recommendations.    Final interpretation:    There is a small focus of gray matter heterotopia adjacent to the frontal horn of the right lateral ventricle just anterior to the basal ganglia. It measures approximately 5 mm in diameter. Given the minimal involvement, the relevance of this to the  patient's seizures is unclear. No additional foci of gray matter heterotopia are identified. The temporal lobes are symmetric. No cortical dysplasia is identified. Final report dictated on 8/4/2020 at 8:42 AM    Signer Name: Curt Thakur MD   Signed: 8/4/2020 8:42 AM   Workstation Name: [id number]    [facility]    CT Head Without Contrast [[id number]] Collected:  08/03/20 1521     Updated:  08/03/20 1729    Narrative:       EXAMINATION: CT HEAD WO CONTRAST-08/03/2020:      INDICATION: HA, seizure.     TECHNIQUE: Axial CT of the head with coronal reformat.     The radiation dose reduction device was turned on for each scan per  the  ALARA (As Low as Reasonably Achievable) protocol.     COMPARISON: NONE.     FINDINGS: No acute hemorrhage, mass or mass effect. Gray-white  differentiation is maintained without evidence of territorial infarct.  Unremarkable ventricular configuration and caliber. Normal appearance of  the orbits. The paranasal sinuses are grossly clear.       Impression:       No acute intracranial abnormality.     D:  08/03/2020  E:  08/03/2020           This report was finalized on 8/3/2020 5:26 PM by Sal Patrick.                            Plan for Follow-up of Pending Labs/Results: We will notify you if your CSF culture returns positive.   Order Current Status    Culture, CSF - Cerebrospinal Fluid, Lumbar Puncture Preliminary result        Discharge Details        Discharge Medications      New Medications      Instructions Start Date   levETIRAcetam 500 MG tablet  Commonly known as:  KEPPRA   500 mg, Oral, 2 Times Daily         Stop These Medications    buPROPion  MG 24 hr tablet  Commonly known as:  WELLBUTRIN XL            No Known Allergies      Discharge Disposition:  Home or Self Care    Diet:  Hospital:  Diet Order   Procedures   • Diet Regular       Activity:  Activity Instructions     Driving Restrictions      Type of Restriction:  Driving    Driving Restrictions:  No Driving          Restrictions or Other Recommendations:  No driving 90 days       CODE STATUS:    Code Status and Medical Interventions:   Ordered at: 08/03/20 1822     Level Of Support Discussed With:    Patient     Code Status:    CPR     Medical Interventions (Level of Support Prior to Arrest):    Full       Future Appointments   Date Time Provider Department Center   8/10/2020  4:45 PM Ernesto Wade MD [facility]       Additional Instructions for the Follow-ups that You Need to Schedule     Discharge Follow-up with Specialty: Neurology   As directed      Specialty:  Neurology    Follow Up Details:  As scheduled by Dr. GOLDSTEIN                      Electronically signed by Cheyenne Carter MD, 08/04/20, 11:27 AM.      Time Spent on Discharge:  I spent  35 minutes on this discharge activity which included: face-to-face encounter with the patient, reviewing the data in the system, coordination of the care with the nursing staff as well as consultants, documentation, and entering orders.

## 2020-08-04 NOTE — OUTREACH NOTE
Prep Survey      Responses   [facility] facility patient discharged from?  [place]   Is LACE score < 7 ?  Yes   Eligibility  [facility]    Date of Admission  08/03/20   Date of Discharge  08/04/20   Discharge Disposition  Home or Self Care   Discharge diagnosis  Seizure   COVID-19 Test Status  Not tested   Does the patient have one of the following disease processes/diagnoses(primary or secondary)?  Other   Does the patient have Home health ordered?  No   Is there a DME ordered?  No   Prep survey completed?  Yes          Lata Garcia RN

## 2020-08-04 NOTE — PLAN OF CARE
Problem: Patient Care Overview  Goal: Plan of Care Review  Outcome: Ongoing (interventions implemented as appropriate)  Note:   Patient came from ED with father who is a physician here at [facility]. He states that he fell over and does not remember anything else. His father brought him to ED and patient had another seizure while there. Pt is AO x4 and VSS on RA. NSR on monitor. Patient is anxious and did not want to stay by himself so approval was given for dad to stay with him. He then asked if his girlfriend could stay, but house said no. Pt was not happy with that decision. MRI of brain conducted last night and EEG scheduled for this morning. Urine drug screen found THC. Seizure bads placed at bed, pt told to please call when he needs to get up. Urinal at bedside. No c/o pain this shift. Patient has gone in and out of sleep this shift. Will continue to monitor

## 2020-08-05 ENCOUNTER — TRANSITIONAL CARE MANAGEMENT TELEPHONE ENCOUNTER (OUTPATIENT)
Dept: CALL CENTER | Facility: HOSPITAL | Age: 35
End: 2020-08-05

## 2020-08-05 NOTE — OUTREACH NOTE
"Call Center TCM Note      Responses   [facility] [place] patient discharged from?  [place]   COVID-19 Test Status  Not tested   Does the patient have one of the following disease processes/diagnoses(primary or secondary)?  Other   TCM attempt successful?  Yes   Call start time  1433   Call end time  1442   Discharge diagnosis  Seizure   Is patient permission given to speak with other caregiver?  No   Meds reviewed with patient/caregiver?  Yes   Is the patient having any side effects they believe may be caused by any medication additions or changes?  Yes   Side effects comments   Patient states that he is \"sleep talking\".    Does the patient have all medications ordered at discharge?  Yes   Is the patient taking all medications as directed (includes completed medication regime)?  Yes   Does the patient have a primary care provider?   Yes   Does the patient have an appointment with their PCP within 7 days of discharge?  Yes   Comments regarding PCP  PCP Dr Ernesto Wade. Appt scheduled for 8/10 445pm.    Has the patient kept scheduled appointments due by today?  N/A   Comments  Patient also needs to follow up with neurology. NO appt in place.    Has home health visited the patient within 72 hours of discharge?  N/A   Pulse Ox monitoring  None   Psychosocial issues?  No   Psychosocial comments  Patient verbalizes awareness that he is not to drive.    Did the patient receive a copy of their discharge instructions?  Yes   Nursing interventions  Reviewed instructions with patient   What is the patient's perception of their health status since discharge?  Improving   Is the patient/caregiver able to teach back signs and symptoms related to disease process for when to call PCP?  Yes   Is the patient/caregiver able to teach back signs and symptoms related to disease process for when to call 911?  Yes   Is the patient/caregiver able to teach back the hierarchy of who to call/visit for symptoms/problems? PCP, Specialist, " Home health nurse, Urgent Care, ED, 911  Yes   TCM call completed?  Yes          Lata Olvera RN    8/5/2020, 14:42

## 2020-08-05 NOTE — OUTREACH NOTE
Call Center TCM Note      Responses   [facility] patient discharged from?  [place]   COVID-19 Test Status  Not tested   Does the patient have one of the following disease processes/diagnoses(primary or secondary)?  Other   TCM attempt successful?  No   Unsuccessful attempts  Attempt 1   Call Status  -- [Mailbox full, unable to leave message. ]          Lata Olvera RN    8/5/2020, 08:32

## 2020-08-06 LAB
BACTERIA SPEC AEROBE CULT: NORMAL
GRAM STN SPEC: NORMAL

## 2020-08-08 ENCOUNTER — TELEPHONE (OUTPATIENT)
Dept: INTERNAL MEDICINE | Facility: CLINIC | Age: 35
End: 2020-08-08

## 2020-08-10 ENCOUNTER — OFFICE VISIT (OUTPATIENT)
Dept: INTERNAL MEDICINE | Facility: CLINIC | Age: 35
End: 2020-08-10

## 2020-08-10 VITALS
BODY MASS INDEX: 21.44 KG/M2 | HEART RATE: 84 BPM | SYSTOLIC BLOOD PRESSURE: 132 MMHG | HEIGHT: 75 IN | WEIGHT: 172.4 LBS | DIASTOLIC BLOOD PRESSURE: 88 MMHG | TEMPERATURE: 97.1 F

## 2020-08-10 DIAGNOSIS — R56.9 SEIZURE (HCC): Primary | ICD-10-CM

## 2020-08-10 DIAGNOSIS — F41.9 ANXIETY: ICD-10-CM

## 2020-08-10 DIAGNOSIS — F33.1 MODERATE EPISODE OF RECURRENT MAJOR DEPRESSIVE DISORDER (HCC): ICD-10-CM

## 2020-08-10 PROCEDURE — 99496 TRANSJ CARE MGMT HIGH F2F 7D: CPT | Performed by: INTERNAL MEDICINE

## 2020-08-10 RX ORDER — LORAZEPAM 0.5 MG/1
0.5 TABLET ORAL NIGHTLY PRN
Qty: 10 TABLET | Refills: 0 | Status: SHIPPED | OUTPATIENT
Start: 2020-08-10 | End: 2022-07-08

## 2020-08-10 NOTE — PROGRESS NOTES
[facility]     Henry Ashley  1985   [id number]      Patient Care Team:  Ernesto Wade MD as PCP - General (Internal Medicine)    Chief Complaint::   Chief Complaint   Patient presents with   • ADD   • Depression   • Seizures        HPI  ***    Chronic Conditions:  ***    Patient Active Problem List   Diagnosis   • Attention or concentration deficit   • Seizure (CMS/[facility])        Past Medical History:   Diagnosis Date   • Acne    • Acute reaction to stress     Situational Stress   • History of attention deficit disorder    • Knee injury    • Proctitis        Past Surgical History:   Procedure Laterality Date   • WISDOM TOOTH EXTRACTION         No family history on file.    Social History     Socioeconomic History   • Marital status: Single     Spouse name: Not on file   • Number of children: Not on file   • Years of education: Not on file   • Highest education level: Not on file   Tobacco Use   • Smoking status: Never Smoker   • Smokeless tobacco: Never Used   Substance and Sexual Activity   • Alcohol use: Yes     Comment: 5-10 drinks a week   • Drug use: No       No Known Allergies    No current outpatient medications on file.    Review of Systems   Constitutional: Negative for activity change, appetite change, diaphoresis, fatigue, unexpected weight gain and unexpected weight loss.   HENT: Negative for hearing loss.    Eyes: Negative for visual disturbance.   Respiratory: Negative for chest tightness and shortness of breath.    Cardiovascular: Negative for chest pain, palpitations and leg swelling.   Gastrointestinal: Negative for abdominal pain, blood in stool, GERD and indigestion.   Endocrine: Negative for cold intolerance and heat intolerance.   Genitourinary: Negative for dysuria and hematuria.   Musculoskeletal: Negative for arthralgias and myalgias.   Skin: Negative for skin lesions.   Neurological: Negative for tremors, seizures, syncope, speech difficulty, weakness, headache,  "memory problem and confusion.   Hematological: Does not bruise/bleed easily.   Psychiatric/Behavioral: Negative for sleep disturbance and depressed mood. The patient is not nervous/anxious.         Vital Signs  Vitals:    08/10/20 1700   BP: 132/88   BP Location: Left arm   Patient Position: Sitting   Cuff Size: Adult   Pulse: 84   Temp: 97.1 °F (36.2 °C)   Weight: 78.2 kg (172 lb 6.4 oz)   Height: 190.5 cm (75\")   PainSc: 0-No pain       Physical Exam   Procedures    ACE III MINI             Assessment/Plan:    There are no diagnoses linked to this encounter.      Plan of care reviewed with patient at the conclusion of today's visit. Education was provided regarding diagnosis, management, and any prescribed or recommended OTC medications.Patient verbalizes understanding of and agreement with management plan.         Ethel Elliott MA           Answers for HPI/ROS submitted by the patient on 8/3/2020   What is the primary reason for your visit?: Other  Please describe your symptoms.: Depression  Have you had these symptoms before?: Yes  How long have you been having these symptoms?: 1-2 weeks  Please list any medications you are currently taking for this condition.: Wellbutren  Please describe any probable cause for these symptoms. : Work issues    "

## 2020-08-11 NOTE — PROGRESS NOTES
Answers for HPI/ROS submitted by the patient on 8/3/2020   What is the primary reason for your visit?: Other  Please describe your symptoms.: Depression  Have you had these symptoms before?: Yes  How long have you been having these symptoms?: 1-2 weeks  Please list any medications you are currently taking for this condition.: Wellbutren  Please describe any probable cause for these symptoms. : Work issues  Transitional Care Follow Up Visit  Subjective     Henry Ashley is a 35 y.o. male who presents for a transitional care management visit.    Within 48 business hours after discharge our office contacted him via telephone to coordinate his care and needs.      I reviewed and discussed the details of that call along with the discharge summary, hospital problems, inpatient lab results, inpatient diagnostic studies, and consultation reports with Henry.     Current outpatient and discharge medications have been reconciled for the patient.  Reviewed by: Ernesto Waed MD      Date of TCM Phone Call 8/4/2020   [facility]    Date of Admission 8/3/2020   Date of Discharge 8/4/2020   Discharge Disposition Home or Self Care     Risk for Readmission (LACE) Score: 1 (8/4/2020  6:00 AM)      History of Present Illness   Course During Hospital Stay: Mr. Ashley was admitted to the hospital on August 3 because of a seizure.  This was witnessed by his father.  He was brought to the ED where he was witnessed to have another generalized tonic-clonic seizure.  He has been on Wellbutrin for several weeks.  The dose has been increased 2 weeks prior.  LP and MRI of the brain were negative.  His seizure was felt possibly to be secondary to Wellbutrin.  He was given a 3-day course of Keppra to cover the seizures.  He was discharged on the fourth and began to develop hallucinations.  He had hallucinations for 2 to 3 days following visit.  This was attributed to Keppra. these were visual, auditory.  He has completed 3-day  "course of Keppra and there is been no hallucinations or seizure activity in the past 12 hours.  He states he has not slept in 7 days.  He is anxious and a bit agitated in the office today.  He states he has symptoms only intermittently and would like to be able to take something just as needed for that.  When he is placed on a daily drug for depression he feels like he is a \"drug addict.\"  He now states that prior to the onset of seizures he was experiencing headaches while on bupropion.  Despite that he has been to the gym since he has been out of the hospital and has been able to exercise and says his appetite is good.     The following portions of the patient's history were reviewed and updated as appropriate: allergies, current medications, past family history, past medical history, past social history, past surgical history and problem list.    Review of Systems   Constitutional: Negative for activity change and fever.   HENT: Negative.    Respiratory: Negative for cough and shortness of breath.    Cardiovascular: Negative for chest pain and palpitations.   Gastrointestinal: Negative.    Psychiatric/Behavioral: Positive for sleep disturbance. The patient is nervous/anxious.        Objective   Physical Exam   Constitutional: He is oriented to person, place, and time. He appears well-developed and well-nourished.   Neck: Normal range of motion. No tracheal deviation present.   Cardiovascular: Normal rate and regular rhythm.   No murmur heard.  Pulmonary/Chest: Effort normal and breath sounds normal.   Neurological: He is alert and oriented to person, place, and time.   Psychiatric: His mood appears anxious.       Assessment/Plan   Henry was seen today for add, depression and seizures.    Diagnoses and all orders for this visit:    Seizure (CMS/[facility])    Anxiety  -     LORazepam (Ativan) 0.5 MG tablet; Take 1 tablet by mouth At Night As Needed for Sedation.    Moderate episode of recurrent major depressive disorder " (CMS/[facility])    Plan    New onset seizures, felt to be secondary to bupropion.  He had prodromal symptoms of headache which he did not report.  That would have been reasonable discontinue the drug.  He will follow-up with neurology this will hopefully require no further work-up.    Anxiety and insomnia are the most pressing issue at present.  He is given a short course of lorazepam 0.5 mg at bedtime for the next 7 to 10 days.    Ongoing treatment of depression is going to be problematic.  He declined SSRIs previously because of concerns about sexual side effects and also because of previous lack of response.  Bupropion is certainly contraindicated at this point.  He continues to see his therapist but is not sure is helping.  I have suggested that we refer him to a psychiatrist.  I pointed out to him that despite our efforts his depression persists.  He is functional and not vegetative and certainly not suicidal but part of his current issues stem from his brother's suicide 1 year ago.  He will follow-up in 1 week hopefully monogamously deprived state.

## 2020-08-19 RX ORDER — BUPROPION HYDROCHLORIDE 150 MG/1
TABLET ORAL
Qty: 30 TABLET | Refills: 2 | OUTPATIENT
Start: 2020-08-19

## 2020-08-24 ENCOUNTER — TELEPHONE (OUTPATIENT)
Dept: INTERNAL MEDICINE | Facility: CLINIC | Age: 35
End: 2020-08-24

## 2020-08-24 DIAGNOSIS — R56.9 SEIZURE: Primary | ICD-10-CM

## 2020-08-24 NOTE — TELEPHONE ENCOUNTER
PATIENT STATES THAT HIS APPOINTMENT FOR THE HOSPITAL F/U TO THE NEUROLOGIST  GOT BUMPED FROM 8/26/2020 ALL THE WAY UNTIL 9/28/2020, HE IS NOT HAPPY WITH THIS AND IS REQUESTING TO SEE IF HE COULD GET A REFERRAL TO A DIFFERENT DOCTOR THAT CAN GET HIM IN SOONER      July Rivera MD  Neurologist in [place], [place]  Address: [address] [place] [address] [address], [place] [address]        PLEASE CALL AND ADVISE   [phone]

## 2021-02-12 ENCOUNTER — TELEPHONE (OUTPATIENT)
Dept: INTERNAL MEDICINE | Facility: CLINIC | Age: 36
End: 2021-02-12

## 2021-06-30 ENCOUNTER — OFFICE VISIT (OUTPATIENT)
Dept: INTERNAL MEDICINE | Facility: CLINIC | Age: 36
End: 2021-06-30

## 2021-06-30 VITALS
HEART RATE: 60 BPM | DIASTOLIC BLOOD PRESSURE: 64 MMHG | TEMPERATURE: 98.4 F | HEIGHT: 73 IN | WEIGHT: 201.2 LBS | SYSTOLIC BLOOD PRESSURE: 116 MMHG | BODY MASS INDEX: 26.66 KG/M2

## 2021-06-30 DIAGNOSIS — Z00.00 PREVENTATIVE HEALTH CARE: Primary | ICD-10-CM

## 2021-06-30 DIAGNOSIS — R41.840 ATTENTION OR CONCENTRATION DEFICIT: ICD-10-CM

## 2021-06-30 DIAGNOSIS — L20.81 ATOPIC NEURODERMATITIS: ICD-10-CM

## 2021-06-30 PROCEDURE — 99395 PREV VISIT EST AGE 18-39: CPT | Performed by: INTERNAL MEDICINE

## 2021-07-01 ENCOUNTER — TELEPHONE (OUTPATIENT)
Dept: INTERNAL MEDICINE | Facility: CLINIC | Age: 36
End: 2021-07-01

## 2021-07-01 NOTE — TELEPHONE ENCOUNTER
I actually discussed with him the fact that there was a larger area on his back.  I suggest 1% hydrocortisone cream twice a day.  If not better by next week, I will call in a prescription strength cortisone.

## 2021-07-01 NOTE — TELEPHONE ENCOUNTER
Caller: Henry Ashley    Relationship to patient: Self    Best call back number: [phone]    Patient is needing: PATIENT SAID HE WAS SEEN YESTERDAY IN OFFICE.  DR. CARRINGTON TALKED ABOUT A RASH ON PATIENT'S BACK.  PATIENT SAID AFTER HE GOT HOME AND LOOKED AT IT HE DID NOT REALIZE HOW BIG IT WAS.  PATIENT SAID THAT IS ALSO THE AREA HE IS HAVING SOME PAIN.  PATIENT IS REQUESTING A CALL BACK TO DISCUSS WHAT HE NEEDS TO DO FOR IT.

## 2021-07-02 DIAGNOSIS — B02.9 HERPES ZOSTER WITHOUT COMPLICATION: Primary | ICD-10-CM

## 2021-07-02 RX ORDER — GABAPENTIN 300 MG/1
CAPSULE ORAL
Qty: 30 CAPSULE | Refills: 1 | Status: SHIPPED | OUTPATIENT
Start: 2021-07-02 | End: 2021-07-12

## 2021-07-02 RX ORDER — HYDROCODONE BITARTRATE AND ACETAMINOPHEN 5; 325 MG/1; MG/1
1 TABLET ORAL EVERY 4 HOURS PRN
Qty: 30 TABLET | Refills: 0 | Status: SHIPPED | OUTPATIENT
Start: 2021-07-02 | End: 2021-07-12

## 2021-07-02 RX ORDER — FAMCICLOVIR 500 MG/1
500 TABLET ORAL 3 TIMES DAILY
Qty: 30 TABLET | Refills: 0 | Status: SHIPPED | OUTPATIENT
Start: 2021-07-02 | End: 2022-07-08

## 2021-07-02 NOTE — TELEPHONE ENCOUNTER
PATIENT CALLED AND STATES THAT HIS RASH IS WORSE AND VERY PAINFUL. HE STATES HE CAN'T SLEEP BECAUSE HE CAN'T GET ANY RELIEF. HE IS REQUESTING A STRONGER. PLEASE CALL.

## 2021-07-02 NOTE — TELEPHONE ENCOUNTER
"See below.  I called patient.  He said the rash is spreading and \"very painful\".  He is going to try and send a picture thru MyChart.  He is questioning if this is shingles.  He will call back if he cannot figure out how to send the pic.   "

## 2021-07-02 NOTE — TELEPHONE ENCOUNTER
It didn't look like shingle when he was here.  If it is getting worse, it would be better for him to come in and let me look at it.

## 2021-07-07 ENCOUNTER — TELEPHONE (OUTPATIENT)
Dept: INTERNAL MEDICINE | Facility: CLINIC | Age: 36
End: 2021-07-07

## 2021-07-07 DIAGNOSIS — B02.9 HERPES ZOSTER WITHOUT COMPLICATION: ICD-10-CM

## 2021-07-07 RX ORDER — HYDROCODONE BITARTRATE AND ACETAMINOPHEN 5; 325 MG/1; MG/1
1 TABLET ORAL EVERY 4 HOURS PRN
Qty: 30 TABLET | Refills: 0 | OUTPATIENT
Start: 2021-07-07

## 2021-07-07 NOTE — TELEPHONE ENCOUNTER
I will not be able to fill controlled substances for Henry because he tried to obtain these meds from another physician at the same time as me.

## 2021-07-07 NOTE — TELEPHONE ENCOUNTER
Caller: Henry Ashley    Relationship: Self    Best call back number: [phone]     Medication needed:   Requested Prescriptions     Pending Prescriptions Disp Refills   • HYDROcodone-acetaminophen (NORCO) 5-325 MG per tablet 30 tablet 0     Sig: Take 1 tablet by mouth Every 4 (Four) Hours As Needed for Severe Pain .       When do you need the refill by: 7/7/21    What additional details did the patient provide when requesting the medication: PATIENT STATES HE WAS UNABLE TO  THE HYDROcodone-acetaminophen (NORCO) 5-325 MG per tablet PRESCRIPTION FROM THE PHARMACY DUE TO NOT KNOWING WHICH PHARMACY IT WAS SENT TO. PATIENT IS REQUESTING THE PRESCRIPTION PLEASE BE RESENT TO THE PHARMACY. PATIENT IS REQUESTING A CALLBACK FROM PCP.    Does the patient have less than a 3 day supply:  [x] Yes  [] No    What is the patient's preferred pharmacy: [id number] - [place], [place] - [address] [facility] [address] [facility] [phone]  - [phone] FX

## 2021-07-07 NOTE — TELEPHONE ENCOUNTER
Caller: Henry Ashley    Relationship: Self    Best call back number:    [phone]     Medication needed:     HYDROcodone-acetaminophen (NORCO) 5-325 MG per tablet    PHARMACY TOLD PATIENT THAT A REORDER OF THIS MEDICATION HAS NOT ARRIVED YET 7/7/21    When do you need the refill by:     ASAP    What additional details did the patient provide when requesting the medication:     PATIENT STATED HE IS COMPLETELY OUT OF THE MEDICATION    Does the patient have less than a 3 day supply:  [x] Yes  [] No    What is the patient's preferred pharmacy:      [facility] - [place], [place]    TELEPHONE CONTACT:    [phone]    DR ZEB MD

## 2021-07-07 NOTE — TELEPHONE ENCOUNTER
See note below     Called pharmacy talked to Ad he stated that they filled and patient picked up the Rx that you sent for the Gabapentin and Hydrocodone on 7/2/21 but they also got on the same day a Rx from a  for the same 2 medications that they have on hold

## 2021-07-07 NOTE — TELEPHONE ENCOUNTER
Called  [phone] and spoke to pharmacist (Ad). He states pt did  rx's for hydrocodone and gabapentin from Dr. Wade on 7/2. The same rx's were also sent in on 7/2 by Dr. Weaver. These are on hold at the pharmacy.Pt called today for refill of meds.   Discussed with Dr. Wade and he said he will no longer fill pain meds for pt as he has been requesting from another physician as well

## 2021-07-11 ENCOUNTER — PATIENT MESSAGE (OUTPATIENT)
Dept: INTERNAL MEDICINE | Facility: CLINIC | Age: 36
End: 2021-07-11

## 2021-07-12 RX ORDER — PREDNISONE 10 MG/1
TABLET ORAL
Qty: 18 TABLET | Refills: 0 | Status: SHIPPED | OUTPATIENT
Start: 2021-07-12 | End: 2022-07-08

## 2021-07-12 RX ORDER — LIDOCAINE 50 MG/G
OINTMENT TOPICAL
Qty: 50 G | Refills: 1 | Status: SHIPPED | OUTPATIENT
Start: 2021-07-12 | End: 2022-07-08

## 2021-07-12 NOTE — TELEPHONE ENCOUNTER
From: Henry Ashley  To: Ernesto Wade MD  Sent: 7/11/2021 12:34 PM EDT  Subject: Visit Follow-Up Question    Ran out of all medicines I was given, but still in a good deal of pain and constant burning. When will all this subside? I can sleep a little better than I was, but it is constantly burning about the same as a day or two after I saw you unfortunately. Is there anything topical even?

## 2021-07-19 DIAGNOSIS — B02.9 HERPES ZOSTER WITHOUT COMPLICATION: Primary | ICD-10-CM

## 2021-07-19 RX ORDER — GABAPENTIN 300 MG/1
300 CAPSULE ORAL
Qty: 30 CAPSULE | Refills: 1 | Status: SHIPPED | OUTPATIENT
Start: 2021-07-19 | End: 2021-07-28 | Stop reason: SDUPTHER

## 2021-07-28 DIAGNOSIS — B02.9 HERPES ZOSTER WITHOUT COMPLICATION: ICD-10-CM

## 2021-07-28 RX ORDER — GABAPENTIN 300 MG/1
CAPSULE ORAL
Qty: 150 CAPSULE | Refills: 0 | Status: SHIPPED | OUTPATIENT
Start: 2021-07-28 | End: 2022-07-08

## 2022-05-26 ENCOUNTER — TELEPHONE (OUTPATIENT)
Dept: INTERNAL MEDICINE | Facility: CLINIC | Age: 37
End: 2022-05-26

## 2022-05-26 NOTE — TELEPHONE ENCOUNTER
Dr. Sharon Larios called re: abnormal LFT and wants to discuss with Dr. Wade. Please call her cell  [phone]

## 2022-05-27 NOTE — TELEPHONE ENCOUNTER
Discussed with DR Larios.  She is concerned about treating severe tinea with oral antifungal without further workup of abnormal LFT's.  She is also concerned about possible alcohol or drug use.  She has instructed him to see me for workup.

## 2022-07-08 ENCOUNTER — OFFICE VISIT (OUTPATIENT)
Dept: INTERNAL MEDICINE | Facility: CLINIC | Age: 37
End: 2022-07-08

## 2022-07-08 VITALS
HEART RATE: 80 BPM | DIASTOLIC BLOOD PRESSURE: 96 MMHG | WEIGHT: 183.6 LBS | HEIGHT: 73 IN | TEMPERATURE: 100.2 F | SYSTOLIC BLOOD PRESSURE: 154 MMHG | BODY MASS INDEX: 24.33 KG/M2

## 2022-07-08 DIAGNOSIS — Z00.00 PREVENTATIVE HEALTH CARE: Primary | ICD-10-CM

## 2022-07-08 DIAGNOSIS — R79.89 ABNORMAL LIVER FUNCTION TESTS: ICD-10-CM

## 2022-07-08 DIAGNOSIS — F41.1 GENERALIZED ANXIETY DISORDER: ICD-10-CM

## 2022-07-08 PROCEDURE — 99395 PREV VISIT EST AGE 18-39: CPT | Performed by: INTERNAL MEDICINE

## 2022-07-08 RX ORDER — RAMELTEON 8 MG/1
8 TABLET ORAL NIGHTLY PRN
Qty: 30 TABLET | Refills: 0 | Status: SHIPPED | OUTPATIENT
Start: 2022-07-08

## 2022-07-08 NOTE — PROGRESS NOTES
[facility]     Henry Ashley  1985   [id number]      Patient Care Team:  Ernesto Wade MD as PCP - General (Internal Medicine)    Chief Complaint::   Chief Complaint   Patient presents with   • Annual Exam        HPI    The patient presents today for an annual examination.     Generalized anxiety disorder  The patient states he was diagnosed with generalized anxiety disorder approximately 1.5 months ago by [facility]. He went to see the  clinic and spoke with mental health professionals, and they had put him on buspirone. However, he believes his symptoms are more similar to acute anxiety, and buspirone does not work for acute anxiety. His symptoms can keep him awake at night. He has experienced an increased heart rate with anxiety. He states he is anxious a lot of the time with school and clinical rotations and personal life. 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 He is constantly worrying about time and money for gas. He feels like he is behind with his studying and spoke with his counselor about the stress that he has been experiencing. The patient is trying to have a positive outlook on life situations. He does have feelings of worthlessness. He feels like an outsider all the time when he is with groups of people, and this has been ongoing since he was young. He does feel that he has to initiate all his friendships. The patient states he has had unsuccessful experiences with antidepressant medications.    ADD  He states his attention deficiency disorder is still present. He is easily distracted and has trouble focusing.     Skin concerns  The patient states that he had shingles in the past. He now has had some rashes developing that are similar to tinea pedis. He has boils along his backside and legs. He was put on valacyclovir, acyclovir, Keflex, and 2 other topical creams to treat this, which he did not start for a couple of weeks.     Abnormal liver  function tests  His dermatologist was concerned about abnormal function test. However, around this time, he was drinking a significant amount of bourbon. He states that he had his professor scan his liver, and there was no sign of fatty liver disease. He would like to have his laboratory testing done at [facility].       Chronic Conditions:      Patient Active Problem List   Diagnosis   • Attention or concentration deficit   • Seizure (HCC)        Past Medical History:   Diagnosis Date   • Acne    • Acute reaction to stress     Situational Stress   • History of attention deficit disorder    • Knee injury    • Proctitis        Past Surgical History:   Procedure Laterality Date   • WISDOM TOOTH EXTRACTION         No family history on file.    Social History     Socioeconomic History   • Marital status: Single   Tobacco Use   • Smoking status: Never Smoker   • Smokeless tobacco: Never Used   Substance and Sexual Activity   • Alcohol use: Yes     Comment: 5-10 drinks a week   • Drug use: No       Allergies   Allergen Reactions   • Keppra [Levetiracetam] Hallucinations and Headache     Migraine   • Wellbutrin [Bupropion] Seizure         Current Outpatient Medications:   •  ramelteon (ROZEREM) 8 MG tablet, Take 1 tablet by mouth At Night As Needed for Sleep., Disp: 30 tablet, Rfl: 0    Immunization History   Administered Date(s) Administered   • COVID-19 (PFIZER) PURPLE CAP 12/26/2020, 01/23/2021, 11/12/2021   • DT 11/02/1990   • Hepatitis A 05/12/1998, 06/25/1999   • Hepatitis B 08/28/1992, 09/30/1992, 03/19/1993   • MMR 06/03/1987, 03/13/1991   • Meningococcal Conjugate 03/09/2009   • Polio, Unspecified 1985, 1986, 1986, 11/02/1990   • Td 1985, 1985, 1986, 09/16/1987   • Tdap 12/24/2019        Health Maintenance Due   Topic Date Due   • HEPATITIS C SCREENING  Never done   • ANNUAL PHYSICAL  07/01/2022        Review of Systems   Constitutional: Negative for chills, fatigue and fever.  "  HENT: Negative for congestion, ear pain and sinus pressure.    Respiratory: Negative for cough, chest tightness, shortness of breath and wheezing.    Cardiovascular: Negative for chest pain and palpitations.   Gastrointestinal: Negative for abdominal pain, blood in stool and constipation.   Skin: Negative for color change.   Allergic/Immunologic: Negative for environmental allergies.   Neurological: Negative for dizziness, speech difficulty and headache.   Psychiatric/Behavioral: Negative for decreased concentration. The patient is not nervous/anxious.         Vital Signs  Vitals:    07/08/22 1610   BP: 154/96   BP Location: Left arm   Patient Position: Sitting   Cuff Size: Large Adult   Pulse: 80   Temp: 100.2 °F (37.9 °C)   Weight: 83.3 kg (183 lb 9.6 oz)   Height: 184.2 cm (72.5\")   PainSc: 0-No pain       Physical Exam  Vitals and nursing note reviewed.   Constitutional:       Appearance: He is well-developed.   HENT:      Head: Normocephalic and atraumatic.      Right Ear: External ear normal.      Left Ear: External ear normal.      Nose: Nose normal.      Mouth/Throat:      Pharynx: No oropharyngeal exudate.   Eyes:      Conjunctiva/sclera: Conjunctivae normal.      Pupils: Pupils are equal, round, and reactive to light.   Neck:      Thyroid: No thyromegaly.      Vascular: No JVD.   Cardiovascular:      Rate and Rhythm: Normal rate and regular rhythm.      Heart sounds: Normal heart sounds. No murmur heard.    No friction rub. No gallop.   Pulmonary:      Effort: Pulmonary effort is normal. No respiratory distress.      Breath sounds: Normal breath sounds. No wheezing or rales.   Chest:      Chest wall: No tenderness.   Abdominal:      General: Bowel sounds are normal. There is no distension.      Palpations: Abdomen is soft. There is no mass.      Tenderness: There is no abdominal tenderness. There is no guarding or rebound.      Hernia: No hernia is present.      Comments:  Liver is nonpalpable. "   Musculoskeletal:         General: No tenderness. Normal range of motion.      Cervical back: Normal range of motion and neck supple.   Lymphadenopathy:      Cervical: No cervical adenopathy.   Skin:     General: Skin is warm and dry.      Findings: No erythema or rash.   Neurological:      Mental Status: He is alert and oriented to person, place, and time.      Cranial Nerves: No cranial nerve deficit.      Sensory: No sensory deficit.      Motor: No abnormal muscle tone.      Coordination: Coordination normal.      Deep Tendon Reflexes: Reflexes normal.   Psychiatric:         Behavior: Behavior normal.         Thought Content: Thought content normal.         Judgment: Judgment normal.          Procedures     Fall Risk Screen:  STEADI Fall Risk Assessment has not been completed.    Health Habits and Functional and Cognitive Screening:  No flowsheet data found.    Depression [place]  PHQ-9 Total Score: 1     ACE III MINI             Assessment/Plan:    Diagnoses and all orders for this visit:    1. Preventative health care (Primary)    2. Abnormal liver function tests  -     Hepatic Function Panel; Future  -     Iron Profile; Future  -     JOSE J; Future  -     TSH; Future  -     Hepatitis C antibody; Future  -     Hepatitis A antibody, total; Future  -     Hepatitis B core antibody, total; Future  -     Hepatitis B surface antibody; Future  -     Hepatitis B surface antigen; Future    3. Generalized anxiety disorder    Other orders  -     ramelteon (ROZEREM) 8 MG tablet; Take 1 tablet by mouth At Night As Needed for Sleep.  Dispense: 30 tablet; Refill: 0    1. Prevention  - Overall, he remains in basic good health, but he has considerable mental health issues described below.    2. Anxiety  - Probable underlying depression and attention deficit. Unfortunately, he has had bad experiences with SSRIs and bupropion and is not interested in trying that again. He has never felt counseling has helped much. He asks for a  prescription of Rozerem. I see no harm in this, but I do not think an as needed benzodiazepine is a good idea. He was given buspirone by the  clinic, and I told him that if it were a small dose, that it is unlikely to be therapeutic and that it needs to be worked up. He will consider this option and let me know.    3. Abnormal liver function tests  - This was first seen 2 years ago after he had seizures and was in the hospital, but more recent transaminases done by his dermatologist are about the same with an AST of 66 and ALT of 147. He cannot afford a big work-up. He had an informal ultrasound done in his class and tells me his professor said his liver was anatomically normal. For now, I will accept this for imaging. I have given him orders to take to [facility] for hepatitis studies, iron studies, TSH, and JOSE J as the initial work-up for abnormal liver function tests. If these are negative, then it is probably reasonable to go ahead with the antifungal that the dermatologist wanted to give him as long as we monitor LFTs. We will arrange follow-up once results are back.        Labs  Results for orders placed or performed during the hospital encounter of 08/03/20   Culture, CSF - Cerebrospinal Fluid, Lumbar Puncture    Specimen: Lumbar Puncture; Cerebrospinal Fluid   Result Value Ref Range    CSF Culture No growth at 3 days     Gram Stain No WBCs or organisms seen    Meningitis / Encephalitis Panel, PCR - Cerebrospinal Fluid, Lumbar Puncture    Specimen: Lumbar Puncture; Cerebrospinal Fluid   Result Value Ref Range    ESCHERICHIA COLI K1, PCR Not Detected Not Detected    HAEMOPHILUS INFLUENZAE, PCR Not Detected Not Detected    LISTERIA MONOCYTOGENES, PCR Not Detected Not Detected    NEISSERIA MENINGITIDIS, PCR Not Detected Not Detected    STREPTOCOCCUS AGALACTIAE, PCR Not Detected Not Detected    STREPTOCOCCUS PNEUMONIAE, PCR Not Detected Not Detected    CYTOMEGALOVIRUS (CMV), PCR Not Detected Not Detected     ENTEROVIRUS, PCR Not Detected Not Detected    HERPES SIMPLEX VIRUS 1 (HSV-1), PCR Not Detected Not Detected    HERPES SIMPLEX VIRUS 2 (HSV-2), PCR Not Detected Not Detected    HUMAN PARECHOVIRUS, PCR Not Detected Not Detected    VARICELLA ZOSTER VIRUS (VZV), PCR Not Detected Not Detected    CRYPTOCOCCUS NEOFORMANS / GATTII, PCR Not Detected Not Detected    HUMAN HERPES VIRUS 6 PCR Not Detected Not Detected   Comprehensive Metabolic Panel    Specimen: Blood   Result Value Ref Range    Glucose 186 (H) 65 - 99 mg/dL    BUN 9 6 - 20 mg/dL    Creatinine 1.23 0.76 - 1.27 mg/dL    Sodium 133 (L) 136 - 145 mmol/L    Potassium 3.4 (L) 3.5 - 5.2 mmol/L    Chloride 95 (L) 98 - 107 mmol/L    CO2 21.0 (L) 22.0 - 29.0 mmol/L    Calcium 9.2 8.6 - 10.5 mg/dL    Total Protein 8.0 6.0 - 8.5 g/dL    Albumin 5.00 3.50 - 5.20 g/dL    ALT (SGPT) 46 (H) 1 - 41 U/L    AST (SGOT) 95 (H) 1 - 40 U/L    Alkaline Phosphatase 55 39 - 117 U/L    Total Bilirubin 0.7 0.0 - 1.2 mg/dL    eGFR Non African Amer 67 >60 mL/min/1.73    Globulin 3.0 gm/dL    A/G Ratio 1.7 g/dL    BUN/Creatinine Ratio 7.3 7.0 - 25.0    Anion Gap 17.0 (H) 5.0 - 15.0 mmol/L   Urine Drug Screen - Urine, Clean Catch    Specimen: Urine, Clean Catch   Result Value Ref Range    THC, Screen, Urine Positive (A) Negative    Phencyclidine (PCP), Urine Negative Negative    Cocaine Screen, Urine Negative Negative    Methamphetamine, Ur Negative Negative    Opiate Screen Negative Negative    Amphetamine Screen, Urine Negative Negative    Benzodiazepine Screen, Urine Negative Negative    Tricyclic Antidepressants Screen Negative Negative    Methadone Screen, Urine Negative Negative    Barbiturates Screen, Urine Negative Negative    Oxycodone Screen, Urine Negative Negative    Propoxyphene Screen Negative Negative    Buprenorphine, Screen, Urine Negative Negative   CBC Auto Differential    Specimen: Blood   Result Value Ref Range    WBC 20.14 (H) 3.40 - 10.80 10*3/mm3    RBC 4.22 4.14 -  5.80 10*6/mm3    Hemoglobin 12.0 (L) 13.0 - 17.7 g/dL    Hematocrit 34.8 (L) 37.5 - 51.0 %    MCV 82.5 79.0 - 97.0 fL    MCH 28.4 26.6 - 33.0 pg    MCHC 34.5 31.5 - 35.7 g/dL    RDW 17.2 (H) 12.3 - 15.4 %    RDW-SD 51.7 37.0 - 54.0 fl    MPV 9.1 6.0 - 12.0 fL    Platelets 259 140 - 450 10*3/mm3    Neutrophil % 88.8 (H) 42.7 - 76.0 %    Lymphocyte % 3.4 (L) 19.6 - 45.3 %    Monocyte % 7.2 5.0 - 12.0 %    Eosinophil % 0.0 (L) 0.3 - 6.2 %    Basophil % 0.1 0.0 - 1.5 %    Immature Grans % 0.5 0.0 - 0.5 %    Neutrophils, Absolute 17.88 (H) 1.70 - 7.00 10*3/mm3    Lymphocytes, Absolute 0.68 (L) 0.70 - 3.10 10*3/mm3    Monocytes, Absolute 1.46 (H) 0.10 - 0.90 10*3/mm3    Eosinophils, Absolute 0.00 0.00 - 0.40 10*3/mm3    Basophils, Absolute 0.02 0.00 - 0.20 10*3/mm3    Immature Grans, Absolute 0.10 (H) 0.00 - 0.05 10*3/mm3    nRBC 0.0 0.0 - 0.2 /100 WBC   Protein, CSF - Cerebrospinal Fluid, Lumbar Puncture    Specimen: Lumbar Puncture; Cerebrospinal Fluid   Result Value Ref Range    Protein, Total (CSF) 38.3 15.0 - 45.0 mg/dL   Glucose, CSF - Cerebrospinal Fluid, Lumbar Puncture    Specimen: Lumbar Puncture; Cerebrospinal Fluid   Result Value Ref Range    Glucose, CSF 98 (H) 40 - 70 mg/dL   Cell Count, CSF - Cerebrospinal Fluid, Lumbar Puncture    Specimen: Lumbar Puncture; Cerebrospinal Fluid   Result Value Ref Range    WBC, CSF 0 0 - 5 /mm3    RBC,  (H) 0 - 5 /mm3    Color, CSF Colorless Colorless    Supernatant Color, CSF Colorless Colorless    Appearance, CSF Clear Clear    Volume, CSF 9.5 mL    Tube Number, CSF 4    Cell Count, CSF - Cerebrospinal Fluid, Lumbar Puncture    Specimen: Lumbar Puncture; Cerebrospinal Fluid   Result Value Ref Range    WBC, CSF 0 0 - 5 /mm3    RBC,  (H) 0 - 5 /mm3    Color, CSF Colorless Colorless    Supernatant Color, CSF Colorless Colorless    Appearance, CSF Clear Clear    Volume, CSF 9.5 mL    Tube Number, CSF 1    Procalcitonin    Specimen: Blood   Result Value Ref Range     Procalcitonin 0.09 0.00 - 0.25 ng/mL   Comprehensive Metabolic Panel    Specimen: Blood   Result Value Ref Range    Glucose 105 (H) 65 - 99 mg/dL    BUN 7 6 - 20 mg/dL    Creatinine 0.77 0.76 - 1.27 mg/dL    Sodium 137 136 - 145 mmol/L    Potassium 3.2 (L) 3.5 - 5.2 mmol/L    Chloride 102 98 - 107 mmol/L    CO2 24.0 22.0 - 29.0 mmol/L    Calcium 8.3 (L) 8.6 - 10.5 mg/dL    Total Protein 6.9 6.0 - 8.5 g/dL    Albumin 4.60 3.50 - 5.20 g/dL    ALT (SGPT) 49 (H) 1 - 41 U/L    AST (SGOT) 127 (H) 1 - 40 U/L    Alkaline Phosphatase 51 39 - 117 U/L    Total Bilirubin 0.8 0.0 - 1.2 mg/dL    eGFR Non African Amer 116 >60 mL/min/1.73    Globulin 2.3 gm/dL    A/G Ratio 2.0 g/dL    BUN/Creatinine Ratio 9.1 7.0 - 25.0    Anion Gap 11.0 5.0 - 15.0 mmol/L   CBC Auto Differential    Specimen: Blood   Result Value Ref Range    WBC 11.47 (H) 3.40 - 10.80 10*3/mm3    RBC 3.79 (L) 4.14 - 5.80 10*6/mm3    Hemoglobin 10.7 (L) 13.0 - 17.7 g/dL    Hematocrit 32.3 (L) 37.5 - 51.0 %    MCV 85.2 79.0 - 97.0 fL    MCH 28.2 26.6 - 33.0 pg    MCHC 33.1 31.5 - 35.7 g/dL    RDW 17.4 (H) 12.3 - 15.4 %    RDW-SD 54.3 (H) 37.0 - 54.0 fl    MPV 9.5 6.0 - 12.0 fL    Platelets 223 140 - 450 10*3/mm3    Neutrophil % 71.8 42.7 - 76.0 %    Lymphocyte % 16.1 (L) 19.6 - 45.3 %    Monocyte % 10.8 5.0 - 12.0 %    Eosinophil % 0.5 0.3 - 6.2 %    Basophil % 0.2 0.0 - 1.5 %    Immature Grans % 0.6 (H) 0.0 - 0.5 %    Neutrophils, Absolute 8.23 (H) 1.70 - 7.00 10*3/mm3    Lymphocytes, Absolute 1.85 0.70 - 3.10 10*3/mm3    Monocytes, Absolute 1.24 (H) 0.10 - 0.90 10*3/mm3    Eosinophils, Absolute 0.06 0.00 - 0.40 10*3/mm3    Basophils, Absolute 0.02 0.00 - 0.20 10*3/mm3    Immature Grans, Absolute 0.07 (H) 0.00 - 0.05 10*3/mm3    nRBC 0.0 0.0 - 0.2 /100 WBC   Light Blue Top   Result Value Ref Range    Extra Tube hold for add-on    Green Top (Gel)   Result Value Ref Range    Extra Tube Hold for add-ons.    Lavender Top   Result Value Ref Range    Extra Tube hold  for add-on    Gold Top - SST   Result Value Ref Range    Extra Tube Hold for add-ons.    CSF Tube - Cerebrospinal Fluid, Lumbar Puncture    Specimen: Lumbar Puncture; Cerebrospinal Fluid   Result Value Ref Range    Extra Tube          Counseling was given to patient for the following topics: appropriate exercise 150 minutes/week, disease prevention and healthy eating habits.    Plan of care reviewed with patient at the conclusion of today's visit. Education was provided regarding diagnosis, management, and any prescribed or recommended OTC medications.Patient verbalizes understanding of and agreement with management plan.         Ernesto Wade MD       BMI is within normal parameters. No other follow-up for BMI required.      Transcribed from ambient dictation for Ernesto Wade MD by SAUL HAMEED.  07/08/22   22:30 EDT    Patient verbalized consent to the visit recording.

## 2022-07-12 RX ORDER — BUSPIRONE HYDROCHLORIDE 10 MG/1
10 TABLET ORAL 2 TIMES DAILY
Qty: 60 TABLET | Refills: 2 | Status: SHIPPED | OUTPATIENT
Start: 2022-07-12 | End: 2022-10-17 | Stop reason: SDUPTHER

## 2022-07-14 LAB
ALBUMIN SERPL-MCNC: 4.2 G/DL (ref 4–5)
ALP SERPL-CCNC: 54 IU/L (ref 44–121)
ALT SERPL-CCNC: 34 IU/L (ref 0–44)
AMBIG ABBREV HFP7 DEFAULT: NORMAL
AST SERPL-CCNC: 21 IU/L (ref 0–40)
BILIRUB DIRECT SERPL-MCNC: <0.1 MG/DL (ref 0–0.4)
BILIRUB SERPL-MCNC: 0.3 MG/DL (ref 0–1.2)
C-ANCA TITR SER IF: NORMAL TITER
HAV IGM SERPL QL IA: NEGATIVE
HBV CORE AB SERPL QL IA: NEGATIVE
HBV SURFACE AB SER QL: REACTIVE
HBV SURFACE AG SERPL QL IA: NEGATIVE
HCV AB S/CO SERPL IA: <0.1 S/CO RATIO (ref 0–0.9)
IRON SATN MFR SERPL: 12 % (ref 15–55)
IRON SERPL-MCNC: 44 UG/DL (ref 38–169)
MYELOPEROXIDASE AB SER IA-ACNC: <0.2 UNITS (ref 0–0.9)
P-ANCA ATYPICAL TITR SER IF: NORMAL TITER
P-ANCA TITR SER IF: NORMAL TITER
PROT SERPL-MCNC: 6.1 G/DL (ref 6–8.5)
PROTEINASE3 AB SER IA-ACNC: <0.2 UNITS (ref 0–0.9)
TIBC SERPL-MCNC: 366 UG/DL (ref 250–450)
TSH SERPL DL<=0.005 MIU/L-ACNC: 0.59 UIU/ML (ref 0.45–4.5)
UIBC SERPL-MCNC: 322 UG/DL (ref 111–343)

## 2022-10-17 RX ORDER — BUSPIRONE HYDROCHLORIDE 10 MG/1
10 TABLET ORAL 2 TIMES DAILY
Qty: 60 TABLET | Refills: 5 | Status: SHIPPED | OUTPATIENT
Start: 2022-10-17

## 2022-10-17 NOTE — TELEPHONE ENCOUNTER
Caller: Henry Ashley    Relationship: Self    Best call back number: [phone] - OK TO LEAVE DETAILED MESSAGE IF NO ANSWER 10/17/22 HUB    Requested Prescriptions:   Requested Prescriptions     Pending Prescriptions Disp Refills   • busPIRone (BUSPAR) 10 MG tablet 60 tablet 2     Sig: Take 1 tablet by mouth 2 (Two) Times a Day.        Pharmacy where request should be sent: [facility] [address] [facility] - [phone]  - [phone]      Additional details provided by patient: PLEASE REFILL TODAY- OUT OF MEDICATION - MED STUDENT IN [place]. MEDICATION WAS INCREASED TO TWO TABLETS(20 MG) TWICE PER DAY AND HAS RUN OUT OF MEDICATION. PLEASE REFILL 90 DAY SUPPLY.  Does the patient have less than a 3 day supply:  [x] Yes  [] No    Jean Munson Rep   10/17/22 14:02 EDT

## 2023-09-05 ENCOUNTER — TELEPHONE (OUTPATIENT)
Dept: INTERNAL MEDICINE | Facility: CLINIC | Age: 38
End: 2023-09-05
Payer: COMMERCIAL

## 2023-09-05 DIAGNOSIS — Z13.228 SCREENING FOR METABOLIC DISORDER: ICD-10-CM

## 2023-09-05 DIAGNOSIS — Z13.0 SCREENING FOR DEFICIENCY ANEMIA: Primary | ICD-10-CM

## 2023-09-05 DIAGNOSIS — Z13.220 LIPID SCREENING: ICD-10-CM

## 2023-09-11 ENCOUNTER — LAB (OUTPATIENT)
Dept: LAB | Facility: HOSPITAL | Age: 38
End: 2023-09-11
Payer: COMMERCIAL

## 2023-09-11 DIAGNOSIS — Z13.0 SCREENING FOR DEFICIENCY ANEMIA: ICD-10-CM

## 2023-09-11 DIAGNOSIS — Z13.228 SCREENING FOR METABOLIC DISORDER: ICD-10-CM

## 2023-09-11 DIAGNOSIS — Z13.220 LIPID SCREENING: ICD-10-CM

## 2023-09-11 LAB
BASOPHILS # BLD AUTO: 0.05 10*3/MM3 (ref 0–0.2)
BASOPHILS NFR BLD AUTO: 0.7 % (ref 0–1.5)
DEPRECATED RDW RBC AUTO: 43.6 FL (ref 37–54)
EOSINOPHIL # BLD AUTO: 0.37 10*3/MM3 (ref 0–0.4)
EOSINOPHIL NFR BLD AUTO: 4.8 % (ref 0.3–6.2)
ERYTHROCYTE [DISTWIDTH] IN BLOOD BY AUTOMATED COUNT: 13.8 % (ref 12.3–15.4)
HCT VFR BLD AUTO: 45.6 % (ref 37.5–51)
HGB BLD-MCNC: 15.1 G/DL (ref 13–17.7)
IMM GRANULOCYTES # BLD AUTO: 0.01 10*3/MM3 (ref 0–0.05)
IMM GRANULOCYTES NFR BLD AUTO: 0.1 % (ref 0–0.5)
LYMPHOCYTES # BLD AUTO: 2 10*3/MM3 (ref 0.7–3.1)
LYMPHOCYTES NFR BLD AUTO: 26.2 % (ref 19.6–45.3)
MCH RBC QN AUTO: 28.7 PG (ref 26.6–33)
MCHC RBC AUTO-ENTMCNC: 33.1 G/DL (ref 31.5–35.7)
MCV RBC AUTO: 86.7 FL (ref 79–97)
MONOCYTES # BLD AUTO: 0.59 10*3/MM3 (ref 0.1–0.9)
MONOCYTES NFR BLD AUTO: 7.7 % (ref 5–12)
NEUTROPHILS NFR BLD AUTO: 4.62 10*3/MM3 (ref 1.7–7)
NEUTROPHILS NFR BLD AUTO: 60.5 % (ref 42.7–76)
NRBC BLD AUTO-RTO: 0 /100 WBC (ref 0–0.2)
PLATELET # BLD AUTO: 306 10*3/MM3 (ref 140–450)
PMV BLD AUTO: 9.8 FL (ref 6–12)
RBC # BLD AUTO: 5.26 10*6/MM3 (ref 4.14–5.8)
WBC NRBC COR # BLD: 7.64 10*3/MM3 (ref 3.4–10.8)

## 2023-09-11 PROCEDURE — 80053 COMPREHEN METABOLIC PANEL: CPT

## 2023-09-11 PROCEDURE — 80061 LIPID PANEL: CPT

## 2023-09-11 PROCEDURE — 85025 COMPLETE CBC W/AUTO DIFF WBC: CPT

## 2023-09-12 ENCOUNTER — OFFICE VISIT (OUTPATIENT)
Dept: INTERNAL MEDICINE | Facility: CLINIC | Age: 38
End: 2023-09-12
Payer: COMMERCIAL

## 2023-09-12 VITALS
TEMPERATURE: 98.2 F | WEIGHT: 201 LBS | SYSTOLIC BLOOD PRESSURE: 136 MMHG | HEIGHT: 74 IN | DIASTOLIC BLOOD PRESSURE: 74 MMHG | BODY MASS INDEX: 25.8 KG/M2 | HEART RATE: 72 BPM

## 2023-09-12 DIAGNOSIS — Z00.00 PREVENTATIVE HEALTH CARE: Primary | ICD-10-CM

## 2023-09-12 LAB
ALBUMIN SERPL-MCNC: 3.9 G/DL (ref 3.5–5.2)
ALBUMIN/GLOB SERPL: 1.7 G/DL
ALP SERPL-CCNC: 45 U/L (ref 39–117)
ALT SERPL W P-5'-P-CCNC: 19 U/L (ref 1–41)
ANION GAP SERPL CALCULATED.3IONS-SCNC: 10.6 MMOL/L (ref 5–15)
AST SERPL-CCNC: 22 U/L (ref 1–40)
BILIRUB SERPL-MCNC: 0.4 MG/DL (ref 0–1.2)
BUN SERPL-MCNC: 12 MG/DL (ref 6–20)
BUN/CREAT SERPL: 12.4 (ref 7–25)
CALCIUM SPEC-SCNC: 8.8 MG/DL (ref 8.6–10.5)
CHLORIDE SERPL-SCNC: 105 MMOL/L (ref 98–107)
CHOLEST SERPL-MCNC: 203 MG/DL (ref 0–200)
CO2 SERPL-SCNC: 21.4 MMOL/L (ref 22–29)
CREAT SERPL-MCNC: 0.97 MG/DL (ref 0.76–1.27)
EGFRCR SERPLBLD CKD-EPI 2021: 103.1 ML/MIN/1.73
GLOBULIN UR ELPH-MCNC: 2.3 GM/DL
GLUCOSE SERPL-MCNC: 92 MG/DL (ref 65–99)
HDLC SERPL-MCNC: 52 MG/DL (ref 40–60)
LDLC SERPL CALC-MCNC: 136 MG/DL (ref 0–100)
LDLC/HDLC SERPL: 2.58 {RATIO}
POTASSIUM SERPL-SCNC: 4.1 MMOL/L (ref 3.5–5.2)
PROT SERPL-MCNC: 6.2 G/DL (ref 6–8.5)
SODIUM SERPL-SCNC: 137 MMOL/L (ref 136–145)
TRIGL SERPL-MCNC: 83 MG/DL (ref 0–150)
VLDLC SERPL-MCNC: 15 MG/DL (ref 5–40)

## 2023-09-12 PROCEDURE — 99395 PREV VISIT EST AGE 18-39: CPT | Performed by: INTERNAL MEDICINE

## 2023-09-12 RX ORDER — CETIRIZINE HYDROCHLORIDE 10 MG/1
10 TABLET ORAL DAILY
Qty: 30 TABLET | Refills: 5 | Status: SHIPPED | OUTPATIENT
Start: 2023-09-12

## 2023-09-12 RX ORDER — KETOCONAZOLE 20 MG/G
1 CREAM TOPICAL DAILY
Qty: 30 G | Refills: 1 | Status: SHIPPED | OUTPATIENT
Start: 2023-09-12

## 2023-09-12 NOTE — PROGRESS NOTES
[facility]     Henry Ashley  1985   [id number]      Patient Care Team:  Ernesto Wade MD as PCP - General (Internal Medicine)    Chief Complaint::   Chief Complaint   Patient presents with    Annual Exam    Rash     Atopic dermatitis - Eczema        HPI  The patient comes in for annual examination.    Hypertension  The patient's blood pressure today is 136/74 mmHg. He states that his blood pressure trends up when he has a bit of coffee in the morning. He has been donating plasma regularly and gets his blood pressure taken twice a week.    Atopic dermatitis  The patient was diagnosed with atopic and eczema about 1.5 to 2 years ago. He has had 3 or 4 different areas that are miserable. He has a rash on his back that is a little bit different. The rash on his back looked like a tinea related rash. He has been to the dermatologist twice. They did a biopsy and told him that it was what it was. They did rounds of steroids. He is only allowed to take them for 14 days. He does not notice a change. He has to cycle off. With such large affected areas, it ends up feeling like he is taking a bath and lotion. Sometimes the rashes will pop up, but usually the backs of his knees never goes away. The rashes on his chest usually never goes away. Sometimes it presents very differently. Sometimes it looks like a mix between a zit and an ingrown hair. Sometimes it looks like a bug bite. It is just flesh colored and papular. It spreads like crazy. Sometimes they look like tinea-like. They can get beefy red.    Seasonal allergies  The patient thinks he is growing into decent seasonal allergies. He has been waking up for the past 1 to 2 months with lots of clear rhinorrhea. He is blowing his nose 5 to 10 times right when he wakes up in the morning. He is sneezing a couple of times and they are goopy clear snot. He is wondering if a high dose antihistamine might help with the itching. He is hesitant with the  steroids. He notices it more with stress. He has started to notice some skin changes where he has been rubbing himself raw.    Gout  The patient has had a couple of gouty type episodes. His left toe would be going crazy and he could almost limp. It did not get red and swollen, but it was really painful to manipulate. It was not both bilateral. It would come and go.    Anxiety  The patient is trying to manage his anxiety decently well. He still gets worked up over this. He worries when things get a little crazy. He has had his job accepted for 6 plus months. He is worried that the longer this gets pushed out, they may resend his offer. He does not want to go through the job. He weaned himself off the buspirone. He thinks it did a little bit, but he was going to [place] for 5 weeks and he did not want to deal with filling it over there. He is sleeping okay, but he does not have a lot going on right now. He is trying to get more regular in there. He is trying to eat a little better.    Family history  The patient's father has a history of heart disease. He has modestly elevated cholesterol.    Chronic Conditions:      Patient Active Problem List   Diagnosis    Attention or concentration deficit    Seizure        Past Medical History:   Diagnosis Date    Acne     Acute reaction to stress     Situational Stress    History of attention deficit disorder     Knee injury     Proctitis        Past Surgical History:   Procedure Laterality Date    WISDOM TOOTH EXTRACTION         No family history on file.    Social History     Socioeconomic History    Marital status: Single   Tobacco Use    Smoking status: Never    Smokeless tobacco: Never   Substance and Sexual Activity    Alcohol use: Yes     Comment: 5-10 drinks a week    Drug use: No       Allergies   Allergen Reactions    Keppra [Levetiracetam] Hallucinations and Headache     Migraine    Wellbutrin [Bupropion] Seizure         Current Outpatient Medications:     ramelteon  "(ROZEREM) 8 MG tablet, Take 1 tablet by mouth At Night As Needed for Sleep., Disp: 30 tablet, Rfl: 0    cetirizine (zyrTEC) 10 MG tablet, Take 1 tablet by mouth Daily., Disp: 30 tablet, Rfl: 5    ketoconazole (NIZORAL) 2 % cream, Apply 1 application  topically to the appropriate area as directed Daily., Disp: 30 g, Rfl: 1    Immunization History   Administered Date(s) Administered    COVID-19 (PFIZER) Purple Cap Monovalent 12/26/2020, 01/23/2021, 11/12/2021    DT 11/02/1990    Hepatitis A 05/12/1998, 06/25/1999    Hepatitis B Adult/Adolescent IM 08/28/1992, 09/30/1992, 03/19/1993    Influenza Injectable Mdck Pf Quad 11/14/2022    MMR 06/03/1987, 03/13/1991    Meningococcal Conjugate 03/09/2009    Polio, Unspecified 1986, 1986, 1986, 11/02/1990    Td (TDVAX) 1986, 1986, 1986, 09/16/1987    Tdap 12/24/2019        Health Maintenance Due   Topic Date Due    COVID-19 Vaccine (4 - Pfizer series) 01/07/2022    BMI FOLLOWUP  06/30/2022    ANNUAL PHYSICAL  07/08/2023        Review of Systems   Constitutional: Negative.    Respiratory: Negative.  Negative for chest tightness and shortness of breath.    Cardiovascular: Negative.  Negative for chest pain.   Gastrointestinal:  Negative for abdominal pain, blood in stool, constipation and diarrhea.      Vital Signs  Vitals:    09/12/23 0938   BP: 136/74   BP Location: Left arm   Patient Position: Sitting   Cuff Size: Adult   Pulse: 72   Temp: 98.2 °F (36.8 °C)   Weight: 91.2 kg (201 lb)   Height: 187 cm (73.62\")   PainSc:   3   PainLoc: Generalized  Comment: rash       Physical Exam  Constitutional:       General: He is not in acute distress.     Appearance: Normal appearance.   HENT:      Head: Normocephalic and atraumatic.      Mouth/Throat:      Pharynx: No posterior oropharyngeal erythema.   Neck:      Vascular: No carotid bruit.   Cardiovascular:      Rate and Rhythm: Normal rate and regular rhythm.      Heart sounds: No murmur " heard.  Pulmonary:      Effort: Pulmonary effort is normal.      Breath sounds: Normal breath sounds.   Abdominal:      General: Bowel sounds are normal.      Palpations: Abdomen is soft.      Tenderness: There is no abdominal tenderness.   Musculoskeletal:         General: Normal range of motion.      Cervical back: Normal range of motion and neck supple. No tenderness.   Skin:     General: Skin is warm and dry.   Neurological:      Mental Status: He is alert and oriented to person, place, and time.      Cranial Nerves: No cranial nerve deficit.      Sensory: Sensory deficit present.      Motor: No weakness.      Coordination: Coordination normal.      Gait: Gait normal.      Deep Tendon Reflexes: Reflexes normal.   Psychiatric:         Mood and Affect: Mood normal.         Behavior: Behavior normal.      He has scattered areas of slightly maculopapular rash consistent with atopic dermatitis.    Procedures     Fall Risk Screen:  [facility] Fall Risk Assessment has not been completed.    Health Habits and Functional and Cognitive Screening:       No data to display                Depression Sreening  PHQ-9 Total Score: 0     ACE III MINI             Assessment/Plan:    Diagnoses and all orders for this visit:    1. Preventative health care (Primary)    Other orders  -     cetirizine (zyrTEC) 10 MG tablet; Take 1 tablet by mouth Daily.  Dispense: 30 tablet; Refill: 5  -     ketoconazole (NIZORAL) 2 % cream; Apply 1 application  topically to the appropriate area as directed Daily.  Dispense: 30 g; Refill: 1        1. Prevention  Overall, he is much better than he was a year or two ago. He has healthier lifestyle habits, but still has some challenges ahead of him.    2. Hyperlipidemia  LDL is modestly elevated. His father has a history of cardiovascular disease, so at present, he will treat this with lifestyle changes, but we will continue to monitor this over time.    3. Atopic dermatitis  Per his request, he is given  cetirizine and ketoconazole cream to see if that helps, but if it does not, he will need to go back and see his dermatologist and consider biologicals.    4. Anxiety  Anxiety is reasonably well controlled currently without medication. If needed, we could resume buspirone.    Follow up in 1 year.    BMI is >= 25 and <30. (Overweight) The following options were offered after discussion;: exercise counseling/recommendations and nutrition counseling/recommendations      Labs  Results for orders placed or performed in visit on 09/11/23   Comprehensive Metabolic Panel    Specimen: Blood   Result Value Ref Range    Glucose 92 65 - 99 mg/dL    BUN 12 6 - 20 mg/dL    Creatinine 0.97 0.76 - 1.27 mg/dL    Sodium 137 136 - 145 mmol/L    Potassium 4.1 3.5 - 5.2 mmol/L    Chloride 105 98 - 107 mmol/L    CO2 21.4 (L) 22.0 - 29.0 mmol/L    Calcium 8.8 8.6 - 10.5 mg/dL    Total Protein 6.2 6.0 - 8.5 g/dL    Albumin 3.9 3.5 - 5.2 g/dL    ALT (SGPT) 19 1 - 41 U/L    AST (SGOT) 22 1 - 40 U/L    Alkaline Phosphatase 45 39 - 117 U/L    Total Bilirubin 0.4 0.0 - 1.2 mg/dL    Globulin 2.3 gm/dL    A/G Ratio 1.7 g/dL    BUN/Creatinine Ratio 12.4 7.0 - 25.0    Anion Gap 10.6 5.0 - 15.0 mmol/L    eGFR 103.1 >60.0 mL/min/1.73   Lipid Panel    Specimen: Blood   Result Value Ref Range    Total Cholesterol 203 (H) 0 - 200 mg/dL    Triglycerides 83 0 - 150 mg/dL    HDL Cholesterol 52 40 - 60 mg/dL    LDL Cholesterol  136 (H) 0 - 100 mg/dL    VLDL Cholesterol 15 5 - 40 mg/dL    LDL/HDL Ratio 2.58    CBC Auto Differential    Specimen: Blood   Result Value Ref Range    WBC 7.64 3.40 - 10.80 10*3/mm3    RBC 5.26 4.14 - 5.80 10*6/mm3    Hemoglobin 15.1 13.0 - 17.7 g/dL    Hematocrit 45.6 37.5 - 51.0 %    MCV 86.7 79.0 - 97.0 fL    MCH 28.7 26.6 - 33.0 pg    MCHC 33.1 31.5 - 35.7 g/dL    RDW 13.8 12.3 - 15.4 %    RDW-SD 43.6 37.0 - 54.0 fl    MPV 9.8 6.0 - 12.0 fL    Platelets 306 140 - 450 10*3/mm3    Neutrophil % 60.5 42.7 - 76.0 %    Lymphocyte %  26.2 19.6 - 45.3 %    Monocyte % 7.7 5.0 - 12.0 %    Eosinophil % 4.8 0.3 - 6.2 %    Basophil % 0.7 0.0 - 1.5 %    Immature Grans % 0.1 0.0 - 0.5 %    Neutrophils, Absolute 4.62 1.70 - 7.00 10*3/mm3    Lymphocytes, Absolute 2.00 0.70 - 3.10 10*3/mm3    Monocytes, Absolute 0.59 0.10 - 0.90 10*3/mm3    Eosinophils, Absolute 0.37 0.00 - 0.40 10*3/mm3    Basophils, Absolute 0.05 0.00 - 0.20 10*3/mm3    Immature Grans, Absolute 0.01 0.00 - 0.05 10*3/mm3    nRBC 0.0 0.0 - 0.2 /100 WBC        Counseling was given to patient for the following topics: appropriate exercise as he is doing, disease prevention, and healthy eating habits.    Plan of care reviewed with patient at the conclusion of today's visit. Education was provided regarding diagnosis, management, and any prescribed or recommended OTC medications.Patient verbalizes understanding of and agreement with management plan.         Ernesto Wade MD             Transcribed from ambient dictation for Ernesto Wade MD by Tasha Mercado.  09/12/23   10:42 EDT    Patient or patient representative verbalized consent to the visit recording.  I have personally performed the services described in this document as transcribed by the above individual, and it is both accurate and complete.

## 2024-09-30 ENCOUNTER — OFFICE VISIT (OUTPATIENT)
Dept: INTERNAL MEDICINE | Facility: CLINIC | Age: 39
End: 2024-09-30
Payer: COMMERCIAL

## 2024-09-30 ENCOUNTER — LAB (OUTPATIENT)
Dept: LAB | Facility: HOSPITAL | Age: 39
End: 2024-09-30
Payer: COMMERCIAL

## 2024-09-30 VITALS
BODY MASS INDEX: 25.28 KG/M2 | HEIGHT: 74 IN | WEIGHT: 197 LBS | TEMPERATURE: 97.8 F | HEART RATE: 80 BPM | DIASTOLIC BLOOD PRESSURE: 88 MMHG | SYSTOLIC BLOOD PRESSURE: 122 MMHG

## 2024-09-30 DIAGNOSIS — N52.8 OTHER MALE ERECTILE DYSFUNCTION: ICD-10-CM

## 2024-09-30 DIAGNOSIS — E78.2 MIXED HYPERLIPIDEMIA: ICD-10-CM

## 2024-09-30 DIAGNOSIS — Z00.00 PREVENTATIVE HEALTH CARE: Primary | ICD-10-CM

## 2024-09-30 LAB
ALBUMIN SERPL-MCNC: 4.2 G/DL (ref 3.5–5.2)
ALBUMIN/GLOB SERPL: 2.5 G/DL
ALP SERPL-CCNC: 64 U/L (ref 39–117)
ALT SERPL W P-5'-P-CCNC: 17 U/L (ref 1–41)
ANION GAP SERPL CALCULATED.3IONS-SCNC: 10.4 MMOL/L (ref 5–15)
AST SERPL-CCNC: 20 U/L (ref 1–40)
BILIRUB SERPL-MCNC: 0.5 MG/DL (ref 0–1.2)
BUN SERPL-MCNC: 10 MG/DL (ref 6–20)
BUN/CREAT SERPL: 11.5 (ref 7–25)
CALCIUM SPEC-SCNC: 8.9 MG/DL (ref 8.6–10.5)
CHLORIDE SERPL-SCNC: 105 MMOL/L (ref 98–107)
CHOLEST SERPL-MCNC: 199 MG/DL (ref 0–200)
CO2 SERPL-SCNC: 25.6 MMOL/L (ref 22–29)
CREAT SERPL-MCNC: 0.87 MG/DL (ref 0.76–1.27)
EGFRCR SERPLBLD CKD-EPI 2021: 113.3 ML/MIN/1.73
GLOBULIN UR ELPH-MCNC: 1.7 GM/DL
GLUCOSE SERPL-MCNC: 83 MG/DL (ref 65–99)
HDLC SERPL-MCNC: 56 MG/DL (ref 40–60)
LDLC SERPL CALC-MCNC: 132 MG/DL (ref 0–100)
LDLC/HDLC SERPL: 2.34 {RATIO}
POTASSIUM SERPL-SCNC: 4.5 MMOL/L (ref 3.5–5.2)
PROT SERPL-MCNC: 5.9 G/DL (ref 6–8.5)
SODIUM SERPL-SCNC: 141 MMOL/L (ref 136–145)
TRIGL SERPL-MCNC: 59 MG/DL (ref 0–150)
VLDLC SERPL-MCNC: 11 MG/DL (ref 5–40)

## 2024-09-30 PROCEDURE — 36415 COLL VENOUS BLD VENIPUNCTURE: CPT

## 2024-09-30 PROCEDURE — 80053 COMPREHEN METABOLIC PANEL: CPT

## 2024-09-30 PROCEDURE — 82172 ASSAY OF APOLIPOPROTEIN: CPT

## 2024-09-30 PROCEDURE — 99395 PREV VISIT EST AGE 18-39: CPT | Performed by: INTERNAL MEDICINE

## 2024-09-30 PROCEDURE — 80061 LIPID PANEL: CPT

## 2024-09-30 RX ORDER — SILDENAFIL 100 MG/1
100 TABLET, FILM COATED ORAL DAILY PRN
Qty: 10 TABLET | Refills: 5 | Status: SHIPPED | OUTPATIENT
Start: 2024-09-30

## 2024-09-30 NOTE — PROGRESS NOTES
[facility]     Henry Ashley  1985   [id number]      Patient Care Team:  Ernesto Wade MD as PCP - General (Internal Medicine)    Chief Complaint::   Chief Complaint   Patient presents with    Annual Exam     Not fasting        HPI  History of Present Illness  The patient is a 39-year-old male who presents for annual examination.    He reports overall good health but has been experiencing intermittent erectile dysfunction, which he finds increasingly problematic. He notes that arousal takes longer than usual and maintaining an erection can sometimes be challenging. Despite these issues, his interest in sexual activity remains unchanged. He also mentions some frustration related to his current job situation.    SOCIAL HISTORY  He is working as a phlebotomist at the moment.    FAMILY HISTORY  His father had myocardial infarction in his 60s.    Chronic Conditions:      Patient Active Problem List   Diagnosis    Attention or concentration deficit    Seizure        Past Medical History:   Diagnosis Date    Acne     Acute reaction to stress     Situational Stress    History of attention deficit disorder     Knee injury     Proctitis        Past Surgical History:   Procedure Laterality Date    WISDOM TOOTH EXTRACTION         No family history on file.    Social History     Socioeconomic History    Marital status: Single   Tobacco Use    Smoking status: Never    Smokeless tobacco: Never   Vaping Use    Vaping status: Never Used   Substance and Sexual Activity    Alcohol use: Yes     Comment: 5-10 drinks a week    Drug use: No       Allergies   Allergen Reactions    Keppra [Levetiracetam] Hallucinations and Headache     Migraine    Wellbutrin [Bupropion] Seizure         Current Outpatient Medications:     cetirizine (zyrTEC) 10 MG tablet, Take 1 tablet by mouth Daily., Disp: 30 tablet, Rfl: 5    ketoconazole (NIZORAL) 2 % cream, Apply 1 application  topically to the appropriate area as directed  "Daily., Disp: 30 g, Rfl: 1    ramelteon (ROZEREM) 8 MG tablet, Take 1 tablet by mouth At Night As Needed for Sleep., Disp: 30 tablet, Rfl: 0    sildenafil (Viagra) 100 MG tablet, Take 1 tablet by mouth Daily As Needed for Erectile Dysfunction., Disp: 10 tablet, Rfl: 5    Immunization History   Administered Date(s) Administered    COVID-19 (PFIZER) 12YRS+ (COMIRNATY) 01/16/2024    COVID-19 (PFIZER) Purple Cap Monovalent 12/26/2020, 01/23/2021, 11/12/2021    DT 11/02/1990    Hepatitis A 05/12/1998, 06/25/1999    Hepatitis B Adult/Adolescent IM 08/28/1992, 09/30/1992, 03/19/1993    Influenza Injectable Mdck Pf Quad 11/14/2022, 01/16/2024    MMR 06/03/1987, 03/13/1991    Meningococcal Conjugate 03/09/2009    Polio, Unspecified 1986, 1986, 1986, 11/02/1990    Td (TDVAX) 1986, 1986, 1986, 09/16/1987    Tdap 12/24/2019        Health Maintenance Due   Topic Date Due    LIPID PANEL  09/11/2024    ANNUAL PHYSICAL  09/12/2024    BMI FOLLOWUP  09/12/2024    INFLUENZA VACCINE  08/01/2024        Review of Systems   Constitutional: Negative.    Respiratory: Negative.  Negative for chest tightness and shortness of breath.    Cardiovascular: Negative.  Negative for chest pain.   Gastrointestinal:  Negative for abdominal pain, blood in stool, constipation and diarrhea.        Vital Signs  Vitals:    09/30/24 1002   BP: 122/88   Pulse: 80   Temp: 97.8 °F (36.6 °C)   Weight: 89.4 kg (197 lb)   Height: 187 cm (73.62\")       Physical Exam  Vitals and nursing note reviewed.   Constitutional:       Appearance: Normal appearance. He is well-developed.   HENT:      Head: Normocephalic and atraumatic.      Right Ear: Tympanic membrane, ear canal and external ear normal.      Left Ear: Tympanic membrane, ear canal and external ear normal.      Nose: Nose normal.      Mouth/Throat:      Pharynx: Oropharynx is clear. No oropharyngeal exudate or posterior oropharyngeal erythema.   Eyes:      Extraocular " Movements: Extraocular movements intact.      Conjunctiva/sclera: Conjunctivae normal.      Pupils: Pupils are equal, round, and reactive to light.   Neck:      Thyroid: No thyromegaly.      Vascular: No carotid bruit or JVD.   Cardiovascular:      Rate and Rhythm: Normal rate and regular rhythm.      Heart sounds: Normal heart sounds. No murmur heard.     No friction rub. No gallop.   Pulmonary:      Effort: Pulmonary effort is normal. No respiratory distress.      Breath sounds: Normal breath sounds. No wheezing or rales.   Chest:      Chest wall: No tenderness.   Abdominal:      General: Bowel sounds are normal. There is no distension.      Palpations: Abdomen is soft. There is no mass.      Tenderness: There is no abdominal tenderness. There is no guarding or rebound.      Hernia: No hernia is present.   Musculoskeletal:         General: No tenderness. Normal range of motion.      Cervical back: Normal range of motion and neck supple.      Right lower leg: No edema.      Left lower leg: No edema.   Lymphadenopathy:      Cervical: No cervical adenopathy.   Skin:     General: Skin is warm and dry.      Findings: No erythema or rash.   Neurological:      Mental Status: He is alert and oriented to person, place, and time.      Cranial Nerves: No cranial nerve deficit.      Motor: No weakness or abnormal muscle tone.      Gait: Gait normal.   Psychiatric:         Mood and Affect: Mood normal.         Behavior: Behavior normal.        Physical Exam      Procedures     Fall Risk Screen:  [facility] Fall Risk Assessment has not been completed.    Health Habits and Functional and Cognitive Screening:       No data to display                Depression Sreening  PHQ-9 Total Score: 0     ACE III MINI             Assessment/Plan:    Assessment & Plan  1. Erectile Dysfunction.  He reports difficulty in achieving and maintaining an erection. His interest in sex remains intact, suggesting it is not a hormonal issue. He is given a  trial of sildenafil to help increase blood flow and regain confidence. Full disclosure with his partner is recommended.    2. Hyperlipidemia.  His LDL cholesterol was 136 last year, which is slightly elevated. A lipid panel is pending to reassess his cholesterol levels. He is advised to continue efforts at maintaining a healthy diet and regular exercise.    3. Health Maintenance.  He is advised to get labs done, including a lipid panel, at any [facility]. He is currently playing pickleball a few times a week and lifting weights two to three times a week, which is encouraged to continue.          Labs  Results  Laboratory Studies  LDL cholesterol was 136 last year.    Counseling was given to patient for the following topics: appropriate exercise as he is doing, disease prevention, and healthy eating habits.    Plan of care reviewed with patient at the conclusion of today's visit. Education was provided regarding diagnosis, management, and any prescribed or recommended OTC medications.Patient verbalizes understanding of and agreement with management plan.     Patient or patient representative verbalized consent for the use of Ambient Listening during the visit with  Ernesto Wade MD for chart documentation. 9/30/2024  15:25 EDT        Ernesto Wade MD

## 2024-10-03 LAB — APO B SERPL-MCNC: 99 MG/DL

## 2025-07-14 ENCOUNTER — TELEPHONE (OUTPATIENT)
Dept: INTERNAL MEDICINE | Facility: CLINIC | Age: 40
End: 2025-07-14